# Patient Record
Sex: FEMALE | Race: BLACK OR AFRICAN AMERICAN
[De-identification: names, ages, dates, MRNs, and addresses within clinical notes are randomized per-mention and may not be internally consistent; named-entity substitution may affect disease eponyms.]

---

## 2017-05-17 ENCOUNTER — HOSPITAL ENCOUNTER (EMERGENCY)
Dept: HOSPITAL 68 - ERH | Age: 62
End: 2017-05-17
Payer: COMMERCIAL

## 2017-05-17 VITALS — WEIGHT: 114 LBS | BODY MASS INDEX: 19.46 KG/M2 | HEIGHT: 64 IN

## 2017-05-17 VITALS — SYSTOLIC BLOOD PRESSURE: 155 MMHG | DIASTOLIC BLOOD PRESSURE: 86 MMHG

## 2017-05-17 DIAGNOSIS — I10: ICD-10-CM
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
## 2017-05-17 DIAGNOSIS — Z04.3: Primary | ICD-10-CM
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
## 2017-05-17 LAB
ABSOLUTE GRANULOCYTE CT: 7.5 /CUMM (ref 1.4–6.5)
BASOPHILS # BLD: 0 /CUMM (ref 0–0.2)
BASOPHILS NFR BLD: 0.3 % (ref 0–2)
EOSINOPHIL # BLD: 0 /CUMM (ref 0–0.7)
EOSINOPHIL NFR BLD: 0.1 % (ref 0–5)
ERYTHROCYTE [DISTWIDTH] IN BLOOD BY AUTOMATED COUNT: 14 % (ref 11.5–14.5)
GRANULOCYTES NFR BLD: 85.5 % (ref 42.2–75.2)
HCT VFR BLD CALC: 35.2 % (ref 37–47)
LYMPHOCYTES # BLD: 0.7 /CUMM (ref 1.2–3.4)
MCH RBC QN AUTO: 30.5 PG (ref 27–31)
MCHC RBC AUTO-ENTMCNC: 33.4 G/DL (ref 33–37)
MCV RBC AUTO: 91.4 FL (ref 81–99)
MONOCYTES # BLD: 0.5 /CUMM (ref 0.1–0.6)
PLATELET # BLD: 243 /CUMM (ref 130–400)
PMV BLD AUTO: 8.3 FL (ref 7.4–10.4)
RED BLOOD CELL CT: 3.84 /CUMM (ref 4.2–5.4)
WBC # BLD AUTO: 8.8 /CUMM (ref 4.8–10.8)

## 2017-05-17 NOTE — ED MVC/FALL/TRAUMA COMPLAINT
History of Present Illness
 
General
Chief Complaint: Fall
Stated Complaint: BIBA FALL
Source: patient, family, old records, EMS
Exam Limitations: no limitations
 
Vital Signs & Intake/Output
Vital Signs & Intake/Output
 Vital Signs
 
 
Date Time Temp Pulse Resp B/P B/P Pulse O2 O2 Flow FiO2
 
     Mean Ox Delivery Rate 
 
 1219  81 18 155/86  98 Room Air  
 
 1015 98.2 102 18 159/93  97 Room Air  
 
 
Allergies
Coded Allergies:
NO KNOWN ALLERGIES (16)
 
Triage Nurses Notes Reviewed? yes
Onset: Abrupt
Duration: hour(s): (1), constant
Timing: recent history
Severity: mild
Severity Numbers: 1
Injuries/Fall Location: no injury
Method of Injury: fall
Loss of Consciousness: no loss of consciousness
No Modifying Factors: none
Associated Symptoms: denies
HPI:
61-year-old female presents to the ER for evaluation brought in by ambulance 
with her daughters after she was found on the ground in her kitchen.  The 
patient states that just happens.  She denies any prodromal dizziness 
lightheadedness prior to the fall on arrival the patient denies any complaints 
no chest pain abdominal pain or murmur leg injury however she has bruising noted
to the left hip.  She denies pain to her leg.  Patient has a history of frequent
falls and is noted that she was not using her cane while ambulating today.  She 
is otherwise without any complaints.
 
(BENSON ALFONSO)
Reconcile Medications
Atorvastatin Calcium (Lipitor) 40 MG TABLET   1 TAB PO DAILY cholestrol
Dipyridamole W/ Aspirin (Aggrenox 25 MG-200 MG Capsule) 25 MG-200 MG CPMP.12HR  
1 CAP PO BID HEART HEALTH  (Reported)
Lisinopril 40 MG TABLET   1 TAB PO DAILY HTN  (Reported)
Sertraline HCl 50 MG TABLET   1 TAB PO DAILY MENTAL HEALTH  (Reported)
Sofosbuvir/Velpatasvir (Epclusa 400 MG-100 MG Tablet) 400 MG-100 MG TABLET   1 
TAB PO QPM HCV  (Reported)
 
(ARLENE GABRIEL DO)
 
Past History
 
Travel History
Traveled to [place] past 21 day No
 
Medical History
Any Pertinent Medical History? see below for history
Neurological: CVA ,  major ones multiple CVAs in between  and 
EENT: NONE
Cardiovascular: hypertension, hyperlipidemia
Respiratory: NONE
Gastrointestinal: NONE
Hepatic: hepatitis C
Renal: NONE
Musculoskeletal: NONE
Psychiatric: NONE
Endocrine: NONE
Blood Disorders: NONE
Cancer(s): NONE
GYN/Reproductive: NONE
History of MRSA: No
History of VRE: No
History of CDIFF: No
Pneumonia Vaccine: 16
Influenza Vaccine: 16
 
Surgical History
Surgical History: none
 
Psychosocial History
Who do you live with Family
What is your primary language English
 
Family History
Hx Contributory? No
(BENSON ALFONSO)
 
Review of Systems
 
Review of Systems
Constitutional:
Reports: see HPI. 
All Other Systems: Reviewed and Negative
Comments
Review of systems: See HPI, All other systems negative.
Constitutional, no chills no fever, no malaise no weight loss
HEENT: No visual changes no sore throat no congestion, no ear pain
Cardiovascular: No chest pain , no palpitation , 
Skin:  no rashes, no change in skin
Respiratory: No dyspnea no cough no  sputum 
GI: No nausea no vomiting, no diarrhea, no bloating/constipation
: No dysuria No hematuria, no frequency, no discharge
Muscle skeletal: No joint pain, no joint swelling, no back pain, no neck pain,
Neurologic:  no headache
Psych: No stress 
Heme/endocrine: No bruising no bleeding
Immunology: No lymphadenopathy
(BENSON ALFONSO)
 
Physical Exam
 
Physical Exam
General Appearance: well developed/nourished, alert, awake
Comments:
Well-developed well-nourished person in no acute distress
HEENT: Normal EENT exam; PERRL, EOMI, no nystagmus. HEAD is atraumatic. moist 
mucous membranes.  
Neck: Supple, normal range of motion without pain or tenderness
Back: Nontender, no CVA tenderness. Full range of motion
Cardiovascular: Regular rate and rhythms no murmurs rubs or gallops, normal JVP
Respiratory: Chest nontender.There were no bony deformities, no asymmetry. No 
respiratory distress.  Patient speaking in full complete sentences. Breath 
sounds clear to auscultation bilaterally: NO W/R/R
Abdomen: Soft, nontender nondistended, no appreciable organomegaly. Normal bowel
sounds. No rebound/guarding, No appreciable enlargement of the abdominal aorta, 
No ascites.
UPPER Extremity: No edema, full range of motion of extremities, normal and equal
pulses bilaterally, 5 out of 5 extremities
Hip: mild ecchymosis noted to the lateral L hip, nontender, Stable. FROM. No 
pain with pelvic compression 
Knee: Atraumatic/stable. FROM. No joint swelling, no effusion. No laxity. 
Negative lachman/anterior drawer test. No pain with ROM 
Leg: Atraumatic. Nontender. No edema,  5 out of 5 strength in the lower 
extremity, normal dorsiflexion of great toe bilaterally, gross sensation is 
intact
Ankle/Foot:  Atraumatic/stable. Skin intact. FROM. No swelling, no effusion. No 
laxity on exam 
Pulses: Normal/equal DP/PT pulses bilaterally. Brisk cap refill
Neuro: Alert oriented x3, motor sensory normal, cranial nerves II through XII 
grossly intact. There were no obvious focal neurologic abnormalities.
Skin: No appreciable rash on exposed skin, skin is warm and dry.
Psych: Mood and affect is normal, memory and judgment is normal.
 
Core Measures
ACS in differential dx? Yes
Severe Sepsis Present: No
Septic Shock Present: No
(CHRISSY LINCOLN,BENSON)
 
Progress
Differential Diagnosis: C/T/L spine injury, ext injury, ICH, pelvis injury, 
spinal cord injury, uti, electrolyte abnormality
Plan of Care:
 Orders
 
 
Procedure Date/time Status
 
Straight Cath  1241 Active
 
CULTURE,URINE  1241 Active
 
URINALYSIS  1021 Complete
 
TROPONIN LEVEL  1021 Complete
 
COMPREHENSIVE METABOLIC PANEL  1021 Complete
 
CREATINE PHOSPHOKINASE  1021 Complete
 
CBC WITHOUT DIFFERENTIAL  1021 Complete
 
EKG  1021 Active
 
 
 Laboratory Tests
 
 
 
17 1251:
Urine Color YEL, Urine Clarity CLEAR, Urine pH 6.5, Ur Specific Gravity 1.015, 
Urine Protein 30  H, Urine Ketones NEG, Urine Nitrite NEG, Urine Bilirubin NEG, 
Urine Urobilinogen 0.2, Ur Leukocyte Esterase NEG, Ur Microscopic SEDIMENT 
EXAMINED, Urine RBC 1-3, Urine WBC RARE, Ur Epithelial Cells RARE, Urine 
Bacteria RARE  H, Urine Mucus RARE, Urine Hemoglobin SMALL  H, Urine Glucose NEG
 
17 1034:
Anion Gap 13, Estimated GFR > 60, BUN/Creatinine Ratio 18.8, Glucose 118  H, 
Calcium 9.4, Total Bilirubin 0.5, AST 38  H, ALT 33, Alkaline Phosphatase 59, 
Creatine Kinase 874  H, Troponin I 0.04, Total Protein 7.4, Albumin 4.1, 
Globulin 3.3, Albumin/Globulin Ratio 1.2, CBC w Diff NO MAN DIFF REQ, RBC 3.84  
L, MCV 91.4, MCH 30.5, RDW 14.0, MPV 8.3, Gran % 85.5  H, Lymphocytes % 8.5  L, 
Monocytes % 5.6, Eosinophils % 0.1, Basophils % 0.3, Absolute Granulocytes 7.5  
H, Absolute Lymphocytes 0.7  L, Absolute Monocytes 0.5, Absolute Eosinophils 0, 
Absolute Basophils 0, PUBS MCHC 33.4
 Microbiology
 1251  URINE ROUT: Urine Culture - RECD
 
Patient denies any complaints at this time resting comfortably CAT scans ordered
case discussed with Dr. gabriel who agrees with plan
 
 
Discussed with the patient and her family her CAT scan results, she is 
ambulatory with a walker here with steady gait feels well, the patient was seen 
and evaluated by dr gabriel who agrees with plan
 
I discussed with the patient at length all of their results.  I had an extensive
conversation regarding need for close follow up with their primary care 
physician this week as well as return precautions.  I answered all of their 
questions, they feel comfortable with the plan and follow-up care. 
 
 
I discussed the medications that they will receive with the patient. I gave them
signs and symptoms that could indicate an adverse reaction. I have advised them 
to limit their activities until they can see how they respond to the medication.
 
 
(CHRISSY LINCOLN,BENSON)
Diagnostic Imaging:
Viewed by Me: CT Scan.  Discussed w/RAD: CT Scan. 
Radiology Impression: PATIENT: OCTAVIO VILLANUVEA  MEDICAL RECORD NO: [id number] PRESENT 
AGE: 61  PATIENT ACCOUNT NO: [id number] : 53  LOCATION: [facility] ORDERING 
PHYSICIAN: BENSON LINCOLN     SERVICE DATE:  EXAM TYPE: CAT - CT 
ABD & PELVIS W/O IV CONTRAS; CT CHEST WO IV CONTRAST EXAMINATION: CT CHEST, 
ABDOMEN AND PELVIS WITHOUT IV CONTRAST CLINICAL INFORMATION: Trauma COMPARISON: 
Previous chest x-ray 2016. TECHNIQUE: Axial images through the chest, 
abdomen and pelvis without IV contrast obtained. Sagittal and coronal 
reconstructions on the technologist's workstation were obtained. DLP: 307 mGy-
cm. FINDINGS: CHEST: The lungs are clear. There is no pleural effusion or 
pneumothorax. No enlarged lymph nodes are seen. The heart does not appear 
enlarged. There is a small pericardial effusion. The thoracic aorta is normal in
caliber. No chest wall mass or axillary adenopathy is seen. Evaluation of solid 
organs is limited due to lack of IV contrast. The liver, spleen, pancreas, 
adrenal glands and right kidney are unremarkable. There is a small left lower 
pole renal stone measuring 1 to 2 mm. There is stool throughout the colon. There
is evidence of diverticulosis. Small and large bowel is otherwise unremarkable. 
The appendix is normal. The bladder is normal. The uterus appears enlarged. No 
ascites or free air is seen. No adenopathy is seen. The abdominal aorta is 
normal in caliber. No hernia is seen. No fracture or dislocation is seen.  The 
bones appear osteopenic. IMPRESSION: Limited exam due to lack of IV contrast. No
evidence for acute disease in the chest, abdomen or pelvis. Small left renal 
stone. Diverticulosis. Enlarged uterus. This could be better evaluated with 
pelvic ultrasound. DICTATED BY: BILL JIMENES MD.  DATE/TIME DICTATED:17 :RAD.SHEN  DATE/TIME TRANSCRIBED:17 
CONFIDENTIAL, DO NOT COPY WITHOUT APPROPRIATE AUTHORIZATION.  <Electronically 
signed in Other Vendor System>                                                  
                                     SIGNED BY: BILL JIMENES MD 17 1210
, PATIENT: OCTAVIO VILLANUEVA  MEDICAL RECORD NO: [id number] PRESENT AGE: 61  PATIENT 
ACCOUNT NO: [id number] : 53  LOCATION: [facility] ORDERING PHYSICIAN: BENSON LINCOLN     SERVICE DATE:  EXAM TYPE: CAT - CT CERV SPINE WO IV CONTRAST
EXAMINATION: CT CERVICAL SPINE WITHOUT CONTRAST CLINICAL INFORMATION: Fall. 
Trauma. COMPARISON: None. TECHNIQUE: Axial images through the cervical spine 
without contrast. Sagittal and coronal reconstructions on the technologist 
workstation were performed. DLP: 868 mGy-cm for combined CT of the head and 
cervical spine. FINDINGS: There is curvature of the lower cervical spine to the 
left. Bone alignment is otherwise normal. No fracture or dislocation is seen. 
There is multilevel degenerative spondylosis and degenerative disc disease from 
C3-C4 to C6-C7. There is heterogeneous attenuation to the bones with multiple 
lucent areas, probably representing osteopenia. Prevertebral soft tissues are 
normal. There is right carotid calcification. IMPRESSION: No fracture or 
dislocation seen. Multilevel degenerative spondylosis and degenerative disc 
disease from C3-C4 to C6-C7. DICTATED BY: BILL JIMENES MD  DATE/TIME DICTATED
:17 :RAD.SHEN  DATE/TIME TRANSCRIBED:17 CONFIDENTIAL, DO NOT COPY WITHOUT APPROPRIATE AUTHORIZATION.  <
Electronically signed in Other Vendor System>                                   
                                                    SIGNED BY: BILL JIMENES MD 17 1210, PATIENT: OCTAVIO VILLANUEVA  MEDICAL RECORD NO: [id number] PRESENT AGE: 
61  PATIENT ACCOUNT NO: [id number] : 53  LOCATION: [facility] ORDERING PHYSICIAN:
BENSON LINCOLN     SERVICE DATE:  EXAM TYPE: CAT - CT HEAD WO IV 
CONTRAST EXAMINATION: CT HEAD WITHOUT CONTRAST CLINICAL INFORMATION: Trauma. 
Evaluate for intracranial hemorrhage. COMPARISON: CT scan of the head dated 2016. MRI scan of the head dated 2016. TECHNIQUE: Contiguous axial 
imaging was performed from the skull base to vertex without intravenous 
administration of contrast. DLP: 868.44 mGy-cm this includes the dose report for
the cervical spine CT scan. FINDINGS: There is no evidence of acute intracranial
hemorrhage or territorial infarction. No abnormal mass effect or midline shift 
is seen. Gray to white matter differentiation is well preserved. No extra-axial 
fluid collections are identified. The ventricles and sulci are mildly increased 
in size, consistent with involutional changes. As seen previously, there is 
extensive periventricular deep white matter low-attenuation seen, consistent 
with ischemic small vessel disease. Superimposed multifocal small old lacunar 
infarctions are seen, involving both basal ganglia, thalami, centra semiovale. 
The osseous structures and soft tissues are normal. The mastoid air cells and 
visualized portions of the paranasal sinuses are well aerated. IMPRESSION: 1. No
evidence of acute intracranial hemorrhage or other acute intracranial process. 
2. Extensive periventricular deep white matter ischemic small vessel disease and
multifocal bilateral old lacunar infarctions, unchanged from prior study. 
DICTATED BY: ANANYA AMAYA MD  DATE/TIME DICTATED:17 
:RAD.SHEN  DATE/TIME TRANSCRIBED:17 CONFIDENTIAL, 
DO NOT COPY WITHOUT APPROPRIATE AUTHORIZATION.  <Electronically signed in Other 
Vendor System>                                                                  
                     SIGNED BY: ANANYA AMAYA MD 17 1213
Initial ED EKG: NSR AT 90, NO ACUTE ST SEG CHANGES, NORMAL AXIS
Prior EKG: unchanged (2016)
(CHRISSY LINCOLN,BENSON)
 
Departure
 
Departure
Time of Disposition: 1333
Disposition: HOME OR SELF CARE
Condition: Stable
Clinical Impression
Primary Impression: Fall
Referrals:
TOMMY OROPEZA (PCP/Family)
 
Additional Instructions:
USE YOUR WALKER OR CANE WHENEVER YOU ARE MOVING AROUND. FOLLOW UP WITH YOUR PMD 
THIS WEEK, RETURN TO THE ER WITH ANY CONCERNS
Departure Forms:
Customer Survey
General Discharge Information
(BENSON ALFONSO)
 
PA/NP Co-Sign Statement
Statement:
ED Attending supervision documentation-
 
[x] I saw and evaluated the patient. I have also reviewed all the pertinent lab 
results and diagnostic results. I agree with the findings and the plan of care 
as documented in the PA's/NP's documentation. 
 
[] I have reviewed the ED Record and agree with the PA's/NP's documentation.
 
[] Additions or exceptions (if any) to the PAs/NP's note and plan are 
summarized below:
[]
 
(ARLENE GABRIEL DO

## 2017-05-17 NOTE — CT SCAN REPORT
EXAMINATION:
CT HEAD WITHOUT CONTRAST
 
CLINICAL INFORMATION:
Trauma. Evaluate for intracranial hemorrhage.
 
COMPARISON:
CT scan of the head dated 12/11/2016. MRI scan of the head dated 12/12/2016.
 
TECHNIQUE:
Contiguous axial imaging was performed from the skull base to vertex without
intravenous administration of contrast.
 
DLP:
868.44 mGy-cm this includes the dose report for the cervical spine CT scan.
 
FINDINGS:
There is no evidence of acute intracranial hemorrhage or territorial
infarction. No abnormal mass effect or midline shift is seen. Gray to white
matter differentiation is well preserved. No extra-axial fluid collections
are identified.
 
The ventricles and sulci are mildly increased in size, consistent with
involutional changes. As seen previously, there is extensive periventricular
deep white matter low-attenuation seen, consistent with ischemic small vessel
disease. Superimposed multifocal small old lacunar infarctions are seen,
involving both basal ganglia, thalami, centra semiovale.
 
The osseous structures and soft tissues are normal. The mastoid air cells and
visualized portions of the paranasal sinuses are well aerated.
 
IMPRESSION:
 
1. No evidence of acute intracranial hemorrhage or other acute intracranial
process.
2. Extensive periventricular deep white matter ischemic small vessel disease
and multifocal bilateral old lacunar infarctions, unchanged from prior study.

## 2017-05-17 NOTE — CT SCAN REPORT
EXAMINATION:
CT CHEST, ABDOMEN AND PELVIS WITHOUT IV CONTRAST
 
CLINICAL INFORMATION:
Trauma
 
COMPARISON:
Previous chest x-ray December 2016.
 
TECHNIQUE:
Axial images through the chest, abdomen and pelvis without IV contrast
obtained. Sagittal and coronal reconstructions on the technologist's
workstation were obtained.
 
DLP:
307 mGy-cm.
 
FINDINGS:
CHEST: The lungs are clear. There is no pleural effusion or pneumothorax.
 
No enlarged lymph nodes are seen. The heart does not appear enlarged. There
is a small pericardial effusion. The thoracic aorta is normal in caliber.
 
No chest wall mass or axillary adenopathy is seen.
 
Evaluation of solid organs is limited due to lack of IV contrast. The liver,
spleen, pancreas, adrenal glands and right kidney are unremarkable. There is
a small left lower pole renal stone measuring 1 to 2 mm. There is stool
throughout the colon. There is evidence of diverticulosis. Small and large
bowel is otherwise unremarkable. The appendix is normal. The bladder is
normal. The uterus appears enlarged. No ascites or free air is seen. No
adenopathy is seen. The abdominal aorta is normal in caliber. No hernia is
seen.
 
No fracture or dislocation is seen.  The bones appear osteopenic.
 
IMPRESSION:
Limited exam due to lack of IV contrast. No evidence for acute disease in the
chest, abdomen or pelvis. Small left renal stone.
Diverticulosis. Enlarged uterus. This could be better evaluated with pelvic
ultrasound.

## 2017-05-17 NOTE — CT SCAN REPORT
EXAMINATION:
CT CERVICAL SPINE WITHOUT CONTRAST
 
CLINICAL INFORMATION:
Fall. Trauma.
 
COMPARISON:
None.
 
TECHNIQUE:
Axial images through the cervical spine without contrast. Sagittal and
coronal reconstructions on the technologist workstation were performed.
 
DLP:
868 mGy-cm for combined CT of the head and cervical spine.
 
FINDINGS:
There is curvature of the lower cervical spine to the left. Bone alignment is
otherwise normal. No fracture or dislocation is seen. There is multilevel
degenerative spondylosis and degenerative disc disease from C3-C4 to C6-C7.
There is heterogeneous attenuation to the bones with multiple lucent areas,
probably representing osteopenia. Prevertebral soft tissues are normal. There
is right carotid calcification.
 
IMPRESSION:
No fracture or dislocation seen. Multilevel degenerative spondylosis and
degenerative disc disease from C3-C4 to C6-C7.

## 2018-04-17 ENCOUNTER — HOSPITAL ENCOUNTER (EMERGENCY)
Dept: HOSPITAL 68 - ERH | Age: 63
LOS: 1 days | End: 2018-04-18
Payer: COMMERCIAL

## 2018-04-17 DIAGNOSIS — I10: ICD-10-CM
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
## 2018-04-17 DIAGNOSIS — S09.90XA: Primary | ICD-10-CM

## 2018-04-17 DIAGNOSIS — E78.5: ICD-10-CM

## 2018-04-17 LAB
ABSOLUTE GRANULOCYTE CT: 4.5 /CUMM (ref 1.4–6.5)
BASOPHILS # BLD: 0 /CUMM (ref 0–0.2)
BASOPHILS NFR BLD: 0.3 % (ref 0–2)
EOSINOPHIL # BLD: 0 /CUMM (ref 0–0.7)
EOSINOPHIL NFR BLD: 0.7 % (ref 0–5)
ERYTHROCYTE [DISTWIDTH] IN BLOOD BY AUTOMATED COUNT: 14.4 % (ref 11.5–14.5)
GRANULOCYTES NFR BLD: 72.2 % (ref 42.2–75.2)
HCT VFR BLD CALC: 35.5 % (ref 37–47)
LYMPHOCYTES # BLD: 1.2 /CUMM (ref 1.2–3.4)
MCH RBC QN AUTO: 30.7 PG (ref 27–31)
MCHC RBC AUTO-ENTMCNC: 33.2 G/DL (ref 33–37)
MCV RBC AUTO: 92.4 FL (ref 81–99)
MONOCYTES # BLD: 0.4 /CUMM (ref 0.1–0.6)
PLATELET # BLD: 243 /CUMM (ref 130–400)
PMV BLD AUTO: 7.7 FL (ref 7.4–10.4)
RED BLOOD CELL CT: 3.84 /CUMM (ref 4.2–5.4)
WBC # BLD AUTO: 6.2 /CUMM (ref 4.8–10.8)

## 2018-04-17 NOTE — ED MVC/FALL/TRAUMA COMPLAINT
**See Addendum**
History of Present Illness
 
General
Chief Complaint: Fall
Stated Complaint: UNWITNESSED FALL IN BR
Source: patient
Exam Limitations: no limitations
 
Vital Signs & Intake/Output
Vital Signs & Intake/Output
 Vital Signs
 
 
Date Time Temp Pulse Resp B/P B/P Pulse O2 O2 Flow FiO2
 
     Mean Ox Delivery Rate 
 
 1832       Room Air  
 
 1700  79 18 188/88  99 Room Air  
 
 1428 98.6 98 18 162/98  99 Room Air  
 
 
 
Allergies
Coded Allergies:
NO KNOWN ALLERGIES (16)
 
Reconcile Medications
Atorvastatin Calcium (Lipitor) 40 MG TABLET   1 TAB PO DAILY cholestrol
Dipyridamole W/ Aspirin (Aggrenox 25 MG-200 MG Capsule) 25 MG-200 MG CPMP.12HR  
1 CAP PO BID HEART HEALTH  (Reported)
Lisinopril 40 MG TABLET   1 TAB PO DAILY HTN  (Reported)
Sertraline HCl 50 MG TABLET   1 TAB PO DAILY MENTAL HEALTH  (Reported)
Sofosbuvir/Velpatasvir (Epclusa 400 MG-100 MG Tablet) 400 MG-100 MG TABLET   1 
TAB PO QPM HCV  (Reported)
 
Triage Note:
PT BIBA FROM HOME AFTER SHE FELL IN THE BATHROOM.
PT HAD UNWITNESSED FALL WHILE IN THE BR.  PT
DENIES PAIN.  PT HAD CVA WITH LEFT SIDED WEAKNESS.
FAMILY AT BEDSIDE NO DISTRESS NOTED.  PT WAITING
FOR PROVIDER EVAL
Triage Nurses Notes Reviewed? yes
Onset: Just prior to arrival
Duration: minute(s):
Timing: remote history
Severity: mild
Injuries/Fall Location: ?HEAD STRIKE
Method of Injury: fall
Loss of Consciousness: unsure
No Modifying Factors: none
HPI:
Patient is a 62-year-old female with history of recent CVA, on Aggrenox 
presenting with family members with chief complaint of fall the bathroom prior 
to arrival.   fall was unwitnessed.  Patient reports that she was in the 
bathroom.  Family reports that she left for a few minutes and then the 
granddaughter found her on the ground in the bathroom.  Unsure exactly how she 
fell.  Family reports that she was on the floor for less than 10 minutes.  
Patient denies any pain.  No chest pain or palpitations.  Patient denying any 
headaches.  Denies any vision changes.  Patient denies abdominal pain.
(Carmel LINCOLN,Linda)
 
Past History
 
Travel History
Traveled to [place] past 21 day No
 
Medical History
Any Pertinent Medical History? see below for history
Neurological: CVA ,  major ones multiple CVAs in between  and 
EENT: NONE
Cardiovascular: hypertension, hyperlipidemia
Respiratory: NONE
Gastrointestinal: NONE
Hepatic: hepatitis C
Renal: NONE
Musculoskeletal: NONE
Psychiatric: NONE
Endocrine: NONE
Blood Disorders: NONE
Cancer(s): NONE
GYN/Reproductive: NONE
History of MRSA: No
History of VRE: No
History of CDIFF: No
 
Surgical History
Surgical History: none
 
Psychosocial History
Who do you live with Family
What is your primary language English
Tobacco Use: Never used
ETOH Use: denies use
Illicit Drug Use: denies illicit drug use
 
Family History
Hx Contributory? No
(Linda Jeter)
 
Review of Systems
 
Review of Systems
Constitutional:
Reports: no symptoms. 
Comments
Review of systems: See HPI, All other systems negative.
Constitutional, no chills fever or weight loss
HEENT: No visual changes no sore throat no congestion
Cardiovascular: No chest pain ,palpitation , orthopnea or ankle swelling
Skin, no jaundice no rashes
Respiratory: No dyspnea cough sputum or hemoptysis
GI: No nausea no vomiting
: No dysuria No hematuria
Muscle skeletal: no back pain, no neck pain,
Neurologic: No numbness no INCREASED confusion
Psych: No stress anxiety or depression,.
Heme/endocrine: No bruising no bleeding no polyuria or polydipsia
Immunology: No splenectomy or history of AIDS
(Linda Jeter)
 
Physical Exam
 
Physical Exam
General Appearance: well developed/nourished, no apparent distress, alert, awake
, comfortable
Comments:
THIN person in no acute distress
HEENT: extraocular motion intact, no nystagmus. Pupils equally round and 
reactive to light and accommodation. Nose is atraumatic. External auditory canal
and Tympanic membranes clear. Pharynx normal. No swelling or edema.  No palpable
step-off deformities or hematomas noted over the entire scalp.
Neck: Supple, no lymphadenopathy, normal range of motion without pain or 
tenderness, no C-spine tenderness.
Back: Nontender, no palpable tenderness over the cervical, thoracic or lumbar 
spine.  No crepitus.  No step-off deformities.
Cardiovascular: Regular rate and rhythms no murmurs rubs or gallops, normal JVP
Respiratory: Chest nontender. No respiratory distress.breath sounds clear to 
auscultation bilaterally
Abdomen: Soft, nontender nondistended, no appreciable organomegaly. Normal bowel
sounds. No ascites, no rebound or guarding.
Extremity: No edema, no calf tenderness to palpation, normal and equal pulses.  
Muscular strength is 5 out of 5 in upper and lower extremities.   strength 
is equal and symmetric bilaterally.
Neuro: Alert oriented to person and place, motor sensory normal, cranial nerves 
II through XII grossly intact, patient has residual expressive aphasia when 
trying to speak.  Otherwise cranial nerves are intact.  Patellar reflexes are 1+
bilaterally.  No facial droop.
Skin: No appreciable rash on exposed skin, skin is warm and dry.
Psych: Mood and affect is normal, smiles, cooperative ,memory and judgment is 
normal.
 
Core Measures
ACS in differential dx? Yes
CVA/TIA Diagnosis No
Sepsis Present: No
Sepsis Focused Exam Completed? No
(Carmel LINCOLN,Linda)
 
Progress
Differential Diagnosis: INTRACRANIAL HEMORRHAGE, MINOR HEAD INJURY, 
MULTIFACTORIAL GAIT DISORDER, ELECTROLYTE ABNORMALITY, cva, tia
Plan of Care:
 Orders
 
 
Procedure Date/time Status
 
Heart Healthy Diet  L Active
 
Add-on Test (ER Only)  1459 Active
 
MISTAKE  1459 Active
 
TROPONIN LEVEL  1456 Complete
 
COMPREHENSIVE METABOLIC PANEL  1456 Complete
 
CREATINE PHOSPHOKINASE  1456 Complete
 
CBC WITHOUT DIFFERENTIAL  1456 Complete
 
EKG  1431 Active
 
 
 Laboratory Tests
 
 
 
18 1529:
Anion Gap 11, Estimated GFR > 60, BUN/Creatinine Ratio 18.6, Glucose 89, Calcium
9.4, Total Bilirubin 0.6, AST 22, ALT 31, Alkaline Phosphatase 68, Creatine 
Kinase 99, Troponin I < 0.01, Total Protein 7.8, Albumin 4.1, Globulin 3.7, 
Albumin/Globulin Ratio 1.1, CBC w Diff NO MAN DIFF REQ, RBC 3.84  L, MCV 92.4, 
MCH 30.7, MCHC 33.2, RDW 14.4, MPV 7.7, Gran % 72.2, Lymphocytes % 19.9  L, 
Monocytes % 6.9, Eosinophils % 0.7, Basophils % 0.3, Absolute Granulocytes 4.5, 
Absolute Lymphocytes 1.2, Absolute Monocytes 0.4, Absolute Eosinophils 0, 
Absolute Basophils 0
 
 
2018 4:41:39 PM patient ambulates with STEADY GAIT WITH ROLLING WALKER.  We
will trial her with a rolling walker.
 
2018 6:38:40 PM  patient unable to ambulate with rolling walker secondary 
to weakness.  Patient will be a hold OVER, PT evaluation in the morning.  Likely
rehabilitation placement pending recommendations made by physical therapy.  
Patient will be signed out to Dr. Lane pending holdover.
Diagnostic Imaging:
Viewed by Me: Radiology Read, CT Scan.  Discussed w/RAD: Radiology Read, CT 
Scan. 
Radiology Impression: PATIENT: OCTAVIO VILLANUEVA  MEDICAL RECORD NO: [id number] PRESENT 
AGE: 62  PATIENT ACCOUNT NO: [id number] : 53  LOCATION: [facility] ORDERING 
PHYSICIAN: Linda LINCOLN     SERVICE DATE:  EXAM TYPE: CAT - 
CT CERV SPINE WO IV CONTRAST; CT HEAD WO IV CONTRAST EXAMINATION: CT HEAD 
WITHOUT CONTRAST CT CERVICAL SPINE WITHOUT CONTRAST CLINICAL INFORMATION: Head 
strike. Rule out fracture. COMPARISON: Head CT from 2017. TECHNIQUE: 
Contiguous axial imaging was performed from the skullbase to vertex without 
intravenous administration of contrast. Multidetector helical imaging was 
performed through the cervical spine. DLP: 907.4 mGy-cm. FINDINGS: HEAD: There 
is no evidence of acute intracranial hemorrhage or territorial infarction. No 
abnormal mass effect or midline shift is seen. Gray to white matter 
differentiation is well preserved. No extra-axial fluid collections are 
identified. Diffuse parenchymal volume loss is noted. Severe chronic white 
matter microangiopathic changes are again noted with chronic infarcts in the 
basal ganglia and thalami bilaterally. The osseous structures and soft tissues 
are normal. The mastoid air cells are well aerated. There is mild mucosal 
thickening in the maxillary sinuses. CERVICAL SPINE: No acute fracture or 
dislocation is identified in the cervical spine. Significant multilevel disc 
space narrowing is evident with endplate osteophyte formation. The atlantoaxial 
articulation is normally maintained. The thyroid gland is heterogeneous but not 
enlarged. The paraspinal soft tissues are normal. The lung apices are clear. 
IMPRESSION: 1. No acute intracranial pathology. Severe chronic white matter 
microangiopathy and chronic deep gray matter lacunar infarcts. 2. No evidence of
acute cervical spine traumatic injury. Severe cervical spondylosis. DICTATED BY:
Quinn Solares MD  DATE/TIME DICTATED:18 :RAD.SHEN
 DATE/TIME TRANSCRIBED:18 CONFIDENTIAL, DO NOT COPY WITHOUT 
APPROPRIATE AUTHORIZATION.  <Electronically signed in Other Vendor System>      
                                                                                
SIGNED BY: Quinn Solares , PATIENT: OCTAVIO VILLANUEVA  MEDICAL 
RECORD NO: [id number] PRESENT AGE: 62  PATIENT ACCOUNT NO: [id number] : 53  
LOCATION: [facility] ORDERING PHYSICIAN: Linda LINCOLN     SERVICE DATE: 18102 EXAM TYPE: RAD - XRY-PORTABLE CHEST XRAY EXAMINATION: XR PORTABLE CHEST 
CLINICAL INFORMATION: Trauma. Presumptive diagnosis: Cardiomegaly. COMPARISON: 
CT the chest on 2017. Portable chest x-ray on 2016. TECHNIQUE: 
Portable AP semierect view of the chest was obtained. FINDINGS: No significant 
abnormality is noted involving the lungs, mediastinum, bony thorax or soft 
tissues. The heart is borderline enlarged. IMPRESSION: Heart borderline 
enlarged. No acute disease. DICTATED BY: Sushil Chaidez MD  DATE/TIME DICTATED
:18 :RAD.SHEN  DATE/TIME TRANSCRIBED:18 CONFIDENTIAL, DO NOT COPY WITHOUT APPROPRIATE AUTHORIZATION.  <
Electronically signed in Other Vendor System>                                   
                                                    SIGNED BY: Sushil Chaidez MD 18 1543, PATIENT: OCTAVIO VILLANUEVA  MEDICAL RECORD NO: [id number] PRESENT
AGE: 62  PATIENT ACCOUNT NO: [id number] : 53  LOCATION: [facility] ORDERING 
PHYSICIAN: Linda LINCOLN     SERVICE DATE:  EXAM TYPE: RAD - 
XRY-AP PELVIS EXAMINATION: XR PELVIS CLINICAL INFORMATION: Difficulty 
ambulating. COMPARISON: None TECHNIQUE: AP view of the pelvis. FINDINGS: No 
evidence of acute fracture or dislocation on this single view. There are 
degenerative changes of the distal lumbar spine and SI joints, largely obscured 
by overlying air and stool in the colon. Mild degenerative changes of the pubic 
symphysis and hips. The bowel gas pattern is nonobstructive. There are faint 
vascular calcifications and probable phleboliths in the pelvis. IMPRESSION: No 
evidence of fracture or dislocation on this single view of the pelvis. DICTATED 
BY: Silva Erwin MD  DATE/TIME DICTATED:18 :
RAD.SHEN  DATE/TIME TRANSCRIBED:18 CONFIDENTIAL, DO NOT COPY 
WITHOUT APPROPRIATE AUTHORIZATION.  <Electronically signed in Other Vendor 
System>                                                                         
              SIGNED BY: Silva Erwin MD 18 8918
Initial ED EKG: SINUS RHYTHM AT 87 BPM,NO ACUTE CHANGES FROM PREVIOUS
Hand-Off
   Endorsed To:
Samuel Lane DO
   Endorsed Time: 
   Pending: consult
(Linda Jeter)
 
Departure
 
Departure
Disposition: OTHER [facility] (ACUTE)
Condition: Stable
Clinical Impression
Primary Impression: Fall
Qualifiers:  Encounter type: initial encounter Qualified Code: W19.XXXA - 
Unspecified fall, initial encounter
Secondary Impressions: 
Minor head injury
Qualifiers:  Encounter type: initial encounter Qualified Code: S09.90XA - 
Unspecified injury of head, initial encounter
 
Referrals:
Natalie Godwin (PCP/Family)
 
Additional Instructions:
Follow-up with the primary care physician also make an appointment next 5-7 
days.  Take over-the-counter Tylenol as directed for any aches or pains.  Return
for worsening symptoms or concerns.
Departure Forms:
Customer Survey
General Discharge Information
(Linda Jeter)
 
PA/NP Co-Sign Statement
Statement:
ED Attending supervision documentation-
 
[X] I saw and evaluated the patient. I have also reviewed all the pertinent lab 
results and diagnostic results. I agree with the findings and the plan of care 
as documented in the PA's/NP's documentation. 
 
[] I have reviewed the ED Record and agree with the PA's/NP's documentation.
 
[] Additions or exceptions (if any) to the PAs/NP's note and plan are 
summarized below:
[]
 
 
 
Presently confused.  No acute distress.  There were unable to ambulate her.
(Samuel Lane DO)

## 2018-04-17 NOTE — CT SCAN REPORT
EXAMINATION:
CT HEAD WITHOUT CONTRAST
CT CERVICAL SPINE WITHOUT CONTRAST
 
CLINICAL INFORMATION:
Head strike. Rule out fracture.
 
COMPARISON:
Head CT from 05/17/2017.
 
TECHNIQUE:
Contiguous axial imaging was performed from the skullbase to vertex without
intravenous administration of contrast. Multidetector helical imaging was
performed through the cervical spine.
 
DLP:
907.4 mGy-cm.
 
FINDINGS:
 
HEAD:
There is no evidence of acute intracranial hemorrhage or territorial
infarction. No abnormal mass effect or midline shift is seen. Gray to white
matter differentiation is well preserved. No extra-axial fluid collections
are identified. Diffuse parenchymal volume loss is noted.
 
Severe chronic white matter microangiopathic changes are again noted with
chronic infarcts in the basal ganglia and thalami bilaterally. The osseous
structures and soft tissues are normal. The mastoid air cells are well
aerated. There is mild mucosal thickening in the maxillary sinuses.
 
CERVICAL SPINE:
No acute fracture or dislocation is identified in the cervical spine.
Significant multilevel disc space narrowing is evident with endplate
osteophyte formation. The atlantoaxial articulation is normally maintained.
The thyroid gland is heterogeneous but not enlarged. The paraspinal soft
tissues are normal. The lung apices are clear.
 
IMPRESSION:
1. No acute intracranial pathology. Severe chronic white matter
microangiopathy and chronic deep gray matter lacunar infarcts.
 
2. No evidence of acute cervical spine traumatic injury. Severe cervical
spondylosis.

## 2018-04-17 NOTE — RADIOLOGY REPORT
EXAMINATION:
XR PELVIS
 
CLINICAL INFORMATION:
Difficulty ambulating.
 
COMPARISON:
None
 
TECHNIQUE:
AP view of the pelvis.
 
FINDINGS:
No evidence of acute fracture or dislocation on this single view. There are
degenerative changes of the distal lumbar spine and SI joints, largely
obscured by overlying air and stool in the colon. Mild degenerative changes
of the pubic symphysis and hips. The bowel gas pattern is nonobstructive.
There are faint vascular calcifications and probable phleboliths in the
pelvis.
 
IMPRESSION:
No evidence of fracture or dislocation on this single view of the pelvis.

## 2018-04-17 NOTE — RADIOLOGY REPORT
EXAMINATION:
XR PORTABLE CHEST
 
CLINICAL INFORMATION:
Trauma. Presumptive diagnosis: Cardiomegaly.
 
COMPARISON:
CT the chest on 05/17/2017. Portable chest x-ray on 12/14/2016.
 
TECHNIQUE:
Portable AP semierect view of the chest was obtained.
 
FINDINGS:
No significant abnormality is noted involving the lungs, mediastinum, bony
thorax or soft tissues. The heart is borderline enlarged.
 
IMPRESSION:
Heart borderline enlarged. No acute disease.

## 2018-04-18 VITALS — DIASTOLIC BLOOD PRESSURE: 76 MMHG | SYSTOLIC BLOOD PRESSURE: 146 MMHG

## 2018-08-17 ENCOUNTER — HOSPITAL ENCOUNTER (INPATIENT)
Dept: HOSPITAL 68 - ERH | Age: 63
LOS: 4 days | Discharge: NURSING, REHAB OR LONG-TERM CARE | DRG: 45 | End: 2018-08-21
Attending: INTERNAL MEDICINE
Payer: COMMERCIAL

## 2018-08-17 VITALS — WEIGHT: 102 LBS | HEIGHT: 62 IN | BODY MASS INDEX: 18.77 KG/M2

## 2018-08-17 VITALS — DIASTOLIC BLOOD PRESSURE: 90 MMHG | SYSTOLIC BLOOD PRESSURE: 158 MMHG

## 2018-08-17 DIAGNOSIS — R23.3: ICD-10-CM

## 2018-08-17 DIAGNOSIS — E78.5: ICD-10-CM

## 2018-08-17 DIAGNOSIS — I10: ICD-10-CM

## 2018-08-17 DIAGNOSIS — F01.50: ICD-10-CM

## 2018-08-17 DIAGNOSIS — B18.2: ICD-10-CM

## 2018-08-17 DIAGNOSIS — I63.9: Primary | ICD-10-CM

## 2018-08-17 DIAGNOSIS — I34.0: ICD-10-CM

## 2018-08-17 DIAGNOSIS — E87.6: ICD-10-CM

## 2018-08-17 DIAGNOSIS — I69.351: ICD-10-CM

## 2018-08-17 DIAGNOSIS — I16.0: ICD-10-CM

## 2018-08-17 LAB
ABSOLUTE GRANULOCYTE CT: 4.2 /CUMM (ref 1.4–6.5)
BASOPHILS # BLD: 0 /CUMM (ref 0–0.2)
BASOPHILS NFR BLD: 0.3 % (ref 0–2)
EOSINOPHIL # BLD: 0 /CUMM (ref 0–0.7)
EOSINOPHIL NFR BLD: 0.6 % (ref 0–5)
ERYTHROCYTE [DISTWIDTH] IN BLOOD BY AUTOMATED COUNT: 15.3 % (ref 11.5–14.5)
GRANULOCYTES NFR BLD: 69.3 % (ref 42.2–75.2)
HCT VFR BLD CALC: 35.5 % (ref 37–47)
LYMPHOCYTES # BLD: 1.5 /CUMM (ref 1.2–3.4)
MCH RBC QN AUTO: 30.6 PG (ref 27–31)
MCHC RBC AUTO-ENTMCNC: 34.1 G/DL (ref 33–37)
MCV RBC AUTO: 90 FL (ref 81–99)
MONOCYTES # BLD: 0.3 /CUMM (ref 0.1–0.6)
PLATELET # BLD: 261 /CUMM (ref 130–400)
PMV BLD AUTO: 7.7 FL (ref 7.4–10.4)
RED BLOOD CELL CT: 3.94 /CUMM (ref 4.2–5.4)
WBC # BLD AUTO: 6 /CUMM (ref 4.8–10.8)

## 2018-08-17 PROCEDURE — 70551 MRI BRAIN STEM W/O DYE: CPT

## 2018-08-17 NOTE — HISTORY & PHYSICAL
Tam Layton 08/17/18 2107:
General Information and HPI
MD Statement:
I have seen and personally examined OCTAVIO VILLANUEVA and documented this H&P.
 
The patient is a 62 year old F who presented with a patient stated chief 
complaint of [RLE weakness, slumped to Left side since 10am, last normal last 
evening].
 
Source of Information: family, old records
Exam Limitations: clinical condition, dementia
History of Present Illness:
62 year old female with history of vascular dementia, s/p multiple previous CVA'
s with residual RLE weakness and slurring of speech, on Aggrenox at home, Hep C,
HLD, presents with worsening RLE weakness since 10am this morning, unable to 
ambulate per daughter. Pt was able to understand commands, but did not know time
of day or where she was. History limited by patient secondary to dementia, which
was baseline per daugther. History was obtained from old records and calling 
family, daughter CAMRYN: Last time pt was at baseline strength per daughter was 
when they ate dinner together last night 8/16/18. Pt lives with daughter and has
a visiting home health aide; this morning when nurse aid come to the home around
10 -11 AM she noticed that patient had weakness in her legs, was leaning towards
left side and her blood pressure is 240/110; they called ambulance who brought 
her to [facility] ED. Pt was in her usual state of health yesterday 8/16 and they 
did not notice any fevers/chills/odd behavior/urinary symptoms/falls. Major 
concern for family was the increased weakness in lower extremities and the blood
pressure, as patient had taken her morning blood pressure medication.
 
Allergies/Medications
Allergies:
Coded Allergies:
NO KNOWN ALLERGIES (12/11/16)
 
Home Med list
Atorvastatin Calcium 40 MG TABLET   1 TAB PO DAILY CHOLESTEROL  (Reported)
Dipyridamole W/ Aspirin (Aggrenox 25 MG-200 MG Capsule) 25 MG-200 MG CPMP.12HR  
1 CAP PO BID HEART HEALTH  (Reported)
Lactose-Reduced Food (Ensure Liquid) 237 ML LIQUID   237 ML PO BID NUTRITIONAL 
SUPPLEMENT  (Reported)
Lisinopril 30 MG TABLET   1 TAB PO DAILY BP  (Reported)
Sertraline HCl 50 MG TABLET   1 TAB PO DAILY MENTAL HEALTH  (Reported)
 
 
Past History
 
Travel History
Traveled to [place] past 21 day No
 
Medical History
Neurological: CVA 2001, 2011 major ones multiple CVAs in between 2001 and 2011
EENT: NONE
Cardiovascular: hypertension, hyperlipidemia
Respiratory: NONE
Gastrointestinal: NONE
Hepatic: hepatitis C
Renal: NONE
Musculoskeletal: NONE
Psychiatric: NONE
Endocrine: NONE
Blood Disorders: NONE
Cancer(s): NONE
GYN/Reproductive: NONE
History of MRSA: No
History of VRE: No
History of CDIFF: No
Isolation History: Standard
 
Surgical History
Surgical History: none
 
Past Family/Social History
 
Psychosocial History
Who Do You Live With? child, family
Primary Language: English
Living Will? no
 
Functional Ability
ADLs
Needs Assist: dressing, eating, toileting, bathing. 
Ambulation: cane, walker
IADLs
Needs Assist: shopping, housework, finances, food prep, telephone, 
transportation, medication admin. 
 
Review of Systems
 
Review of Systems
Constitutional:
Reports: see HPI. 
 
Exam & Diagnostic Data
Last 24 Hrs of Vital Signs/I&O
 Vital Signs
 
 
Date Time Temp Pulse Resp B/P B/P Pulse O2 O2 Flow FiO2
 
     Mean Ox Delivery Rate 
 
08/17 2053  72  190/90     
 
08/17 1958 98.6 71 20 190/90  97 Room Air  
 
08/17 1937  72       
 
08/17 1809    204/100     
 
08/17 1649      96 Room Air  
 
08/17 1645 98.9  18 220/108  97 Room Air  
 
 
 
 
Physical Exam
General Appearance Alert, Cooperative, No Acute Distress, THIN, alert, oriented 
x1; pt has dementia is difficult to understant but follows commands, leaning to 
left side
Skin No Rashes
Skin Temp/Moisture Exam: Warm/Dry
HEENT Atraumatic, EOMI, Mucous Membr. moist/pink
Neck Supple
Cardiovascular Regular Rate, Normal S1, Normal S2
Lungs Clear to Auscultation, Normal Air Movement
Abdomen Soft, No Tenderness
Neurological Sensation Intact, Cranial Nerves 3-12 NL, strength 4+/5 at distal b
/l LE; 5/5 to upper extremities
Extremities Normal Pulses
Vascular Pulses Symmetrical
Last 24 Hrs of Labs/Moses:
 Laboratory Tests
 
08/17/18 1645:
Anion Gap 9, Estimated GFR > 60, BUN/Creatinine Ratio 21.3, Glucose 118  H, 
Calcium 9.6, Total Bilirubin 0.4, AST 18, ALT 22, Alkaline Phosphatase 53, Total
Protein 7.7, Albumin 4.2, Globulin 3.5, Albumin/Globulin Ratio 1.2, CBC w Diff 
NO MAN DIFF REQ, RBC 3.94  L, MCV 90.0, MCH 30.6, MCHC 34.1, RDW 15.3  H, MPV 
7.7, Gran % 69.3, Lymphocytes % 24.5, Monocytes % 5.3, Eosinophils % 0.6, 
Basophils % 0.3, Absolute Granulocytes 4.2, Absolute Lymphocytes 1.5, Absolute 
Monocytes 0.3, Absolute Eosinophils 0, Absolute Basophils 0
 
08/17/18 1642:
Urine Color Cancelled, Urine Clarity Cancelled, Urine pH Cancelled, Ur Specific 
Gravity Cancelled, Urine Protein Cancelled, Urine Ketones Cancelled, Urine 
Nitrite Cancelled, Urine Bilirubin Cancelled, Urine Urobilinogen Cancelled, Ur 
Leukocyte Esterase Cancelled, Ur Microscopic Cancelled, Urine Hemoglobin 
Cancelled, Urine Glucose Cancelled
 
 
Diagnostic Data
EKG Results
NSr, heart rate 75, hypertensive changes
 
Assessment/Plan
Assessment:
Mrs Villanueva is a 62F with history of vascular dementia, s/p multiple previous CVA
's (last hospitalization at [facility] in 2016 for CVA) with residual RLE weakness 
and slurring of speech, on Aggrenox at home, Hep C, HLD, presents with worsening
RLE weakness since 10am this morning, unable to ambulate per daughter and 
hypertensive. As she has baseline dementia, this is hypertensive urgency until 
proven otherwise. She had focal neuro deficits (decreased RLE strength with 
inability to ambulate) however was 4+ on exam in ED, likely resolved. Her last 
normal was last night, and so is out of the window for tPA. CT head negative; 
MRI ordered but not completed.
 
Problem List:
#Hypertensive urgency 
#TIA vs Stroke
#Hypokalemia
#PMH Dementia, CVA, Hep C, HLD
 
#Hypertensive urgency vs emergency
-Admit to Telemetry
-Maintain BP below 190/100; permissive hypertension 180/100
-Labetalol 10mg IV if pt becomes crosses threshold BP
-Blood pressure checks q2 hours for 8-10 hours
-Continue ASA/dipyramadole, statin, lisinopril
 
#TIA vs Stroke
-Neurology reccs appreciated
-MRI brain in the am
-Passed swallow eval in ED
-Permissive hypertension
-Neuro checks q2
-PT/OT in AM
 
HypoK
-Recieved K in ED
-F/u BMP in AM
 
DVT PPx: ALPS and Heparin
IV access
Regular Diet
Full Code
Dispo: per PT reccs
 
As Ranked By This Provider
Problem List:
 1. TIA (transient ischemic attack)
 
 
Core Measures/Misc (9/17)
 
Acute Coronary Syndrome
ACS Diagnosis: No
 
Congestive Heart Failure
Congestive Heart Failure Diagnosis No
 
Cerebrovascular Accident
CVA/TIA Diagnosis: Yes
Date Last Known Well: 08/16/18
Time Last Known Well: 2000
Symptom Start Date: 08/17/18
Symptom Start Time: 0900
tPA Risk/Benefit discussion
I have discussed the risks, benefits, and alternatives of Alteplase treatment 
including:
- If given promptly, can resolve or have major improvement in stroke symptoms.
- Bleeding (hemorrhage) is the most common risk that can occur.
- Bleeding may occur into the brain and cause~long term serious disability~
including death - this is rare, affecting about 1% of patients.
- Alternative treatments with proven benefit for patients with stroke include 
aspirin and care in a specialized unit where staff members pay careful attention
to a variety of basic aspects of care.
 
tPA given? No
Reason tPA not ordered Medical Contraindication (out of window)
Swallow Evaluation Pass
Current/Past Hx AFib/AFlutter No
 
VTE (View Protocol)
VTE Risk Factors Age>40
No Mechanical VTE Prophylaxis d/t N/A MechProphylax Ordered
No VTE Pharm Prophylaxis d/t Medical Contraindication (on asa, severe 
hypertension)
 
Sepsis (View protocol)
Sepsis Present: No
If YES complete Sepsis Event Note If YES complete Sepsis Event Note
 
Misa SHARMA,Silvia 08/17/18 2153:
Exam & Diagnostic Data
Last 24 Hrs of Vital Signs/I&O
 Vital Signs
 
 
Date Time Temp Pulse Resp B/P B/P Pulse O2 O2 Flow FiO2
 
     Mean Ox Delivery Rate 
 
08/20 1440 97.8 90 20 150/86  98   
 
08/20 1144       Room Air  
 
08/20 0820  81  140/84     
 
08/20 0739 98.7 74 18 138/94  98 Room Air  
 
08/20 0000       Room Air  
 
08/19 2313    150/88     
 
08/19 2221 98.7 77 18 160/84  98   
 
 
 Intake & Output
 
 
 08/20 1600 08/20 0800 08/20 0000
 
Intake Total 320 120 120
 
Output Total   
 
Balance 320 120 120
 
    
 
Intake, Oral 320 120 120
 
Number   2
 
Bowel   
 
Movements   
 
Patient 47.259 kg  47.259 kg
 
Weight   
 
 
 
 
 
Core Measures/Misc (9/17)
 
Cerebrovascular Accident
NIH Stroke Scale:
Total 4
No Antithrombotic d/t Medical Contraindication
No Anticoagulant d/t Medical Contraindication
Comment cannt access due to previous d
 
Sepsis (View protocol)
If YES complete Sepsis Event Note If YES complete Sepsis Event Note
 
Resident Review Statement
Resident Statement: examined this patient, discussed with intern, agreed with 
intern
Other Findings:
Patient is a 61-year-old female with past medical history of hepatitis C(
completed treatment;sofobvir), hypertension, hyperlipidemia, multiple vascular 
infarct( 2001, 2011, 2016 s/p TPA), vascular dementia,mitral regurgitation, 
urinary incontinence, presentd with weakness of left legs.
 
Most of the history taken from the daughter over the phone.  According to her 
she was at her baseline usually walks with a walker.  She lives at home with the
daughter Ms. Laureano, a home health and usually come daily to help her out.  She 
usually walks with a walker.  She was completely all right till yesterday.  In 
the morning she was having difficulty in the lifting her left leg and lost her 
balance.  When nurse aid come to the home around 10 -11 AM she called her 
daughter and said that she is leaning more towards the left side and her blood 
pressure is 240/110, so they brought her to the [facility] ED.
 
Past medical history -
Hypertension
Hyperlipidemia
History of hepatitis C treated with the SOFOSBUVIR
History of multiple vascular  infarct leading to vascular dementia
Mitral regurgitation
 
Vital signs at the time of admission -temperature 98.9, respiratory rate 18, 
blood pressure 220/108, SPO2 97% on room air.
 
Blood workup -hemoglobin 12.1, hematocrit 35.5, platelet count 261, granulocyte 
69.3, sodium 143, potassium 3.1, chloride 105, carbon DEXA 29, anion gap 9, BUN 
17, creatinine 0.8, glucose 118, calcium 9.6, total bilirubin 0.4, AST 18, ALT 
22, alkaline phosphatase 55, albumin 4.2,
 
CT scan of the head -No evidence for acute intracranial injury. Stable 
appearance of the brain.
 
CXR -No evidence for acute disease.  
 
Assessment and plan -patient is a 62-year-old female with past medical history 
of multiple CVA leading to vascular dementia.  She is baseline walk with a 
walker and needed nursing aid, to give her daily medications.  In the morning 
she was found to have left leg weakness followed by high blood pressure that is 
why brought to the ED.  Her blood workup was normal except hypokalemia and CT 
scan and chest x-ray did not show any evidence of acute cardiopulmonary changes 
or hemorrhage/stroke.  On examination she was not having any focal neurological 
deficit.  Discussed with the doctor Jimmy over the phone, he advised that, as 
she is not having any focal neurological deficit it seems that she may be having
TIA or hypertensive emergency.  She is already on aspirin/dipyridamole, and high
-dose atorvastatin.  We need to control her blood pressure in the range of 180/
100.  He wanted us to continue tablet lisinopril 30 mg daily.  He thinks MRI 
will not help though if needed can be done in the morning.
 
Plan  -
Focal neurological deficit possibly secondary to hypertensive emergency, TIA, 
stroke -
* Admit the patient to telemetry floor.
* Neuro check every 2 hourly
* Permissive hypertension -keep target blood pressure 180/100.
* We will continue aspirin/dipyridamole, atorvastatin, lisinopril
* Follow-up neurology recommendation
* MRI of the brain-tomorrow
* Strict intake output charting
* PT/OT
* Case management evaluation for rehabilitation
* Swallow evaluation
 
Hypokalemia -
* We will repeat the BMP tomorrow
* Potassium is already supplemented in ED
 
CODE STATUS -confirmed from her daughter Clarisa -full code
 
Diet -regular diet
 
DVT prophylaxis -CARMEN/aspirin/dipyridamole
 
Of note -
Please call Mrs.Estella Aguiar -[phone]
 
 
 
 
 
 
Marina SHARMA,Chloé 08/18/18 0010:
Core Measures/Misc (9/17)
 
Sepsis (View protocol)
If YES complete Sepsis Event Note If YES complete Sepsis Event Note
 
Attending MD Review Statement
 
Attending Statement
Attending Assessment/Plan:
This is a 62-year-old female presenting to the hospital for evaluation of 
worsening aphasia and right lower extremity weakness which started earlier this 
morning.  Of note, patient has a past medical history significant for multiple 
CVAs/TIA resulting in mild right lower extremity weakness.  Patient ambulates 
using a walker.  Upon arrival to the emergency department she was found to be 
profoundly hypertensive with apparent confusion worsened from her baseline.
 
Problem list:
Hypertensive emergency leading to changes in mental status associated with 
worsening right lower extremity weakness weakness which is now resolved.  
Differential diagnosis includes acute CVA versus hypertensive emergency versus 
PRESS syndrome.  Neurology was contacted and recommended better blood pressure 
control.  Patient had a CT of the head which was negative.  MRI of the brain 
will not be able to be completed until tomorrow morning.
 
Plan:
We will place blood pressure parameters; tolerate blood pressure no higher than 
190/100
Continue home antihypertensive lisinopril 30 mg p.o. daily and if needed we will
add labetalol 10 mg IV as needed
Monitor blood pressure every 2 hours over the next 8-10 hours
Place patient on telemetry
MRI of the brain tomorrow morning
Continue aspirin and statin
Monitor neuro exam; if focal deficits appreciated we will repeat CT scan of the 
head
Trend troponins
Neurology consultation appreciated

## 2018-08-17 NOTE — HISTORY & PHYSICAL
General Information and HPI
MD Statement:
I have seen and personally examined OCTAVIO VILLANUEVA and documented this H&P.
 
The patient is a 62 year old F who presented with a patient stated chief 
complaint of [cva].
 
Source of Information: family, old records
Exam Limitations: clinical condition
History of Present Illness:
Patient is a 61-year-old female with past medical history of hepatitis C(
completed treatment;sofobvir), hypertension, hyperlipidemia, multiple vascular 
infarct( 2001, 2011, 2016 s/p TPA), vascular dementia,mitral regurgitation, 
presentd with weakness of left legs.
 
Most of the history taken from the daughter over the phone.  According to her 
she was at her baseline usually walks with a walker.  She lives at home with the
daughter Ms. Laureano, a home health and usually come daily to help her out.  She 
usually walks with a walker.  She was completely all right till yesterday.  In 
the morning she was having difficulty in the lifting her left leg and lost her 
balance.  When the 8 come to the home she called her daughter and said that she 
is leaning more towards the left side and her blood pressure is 240/110, so they
brought her to the [facility] ED.
 
Past medical history -
Hypertension
Hyperlipidemia
History of hepatitis C treated with the SOFOSBUVIR
History of multiple vascular  infarct leading to vascular dementia
Mitral regurgitation
 
 
 
 
 
 
 
Allergies/Medications
Allergies:
Coded Allergies:
NO KNOWN ALLERGIES (12/11/16)
 
Home Med list
Atorvastatin Calcium 40 MG TABLET   1 TAB PO DAILY CHOLESTEROL  (Reported)
Dipyridamole W/ Aspirin (Aggrenox 25 MG-200 MG Capsule) 25 MG-200 MG CPMP.12HR  
1 CAP PO BID HEART HEALTH  (Reported)
Lactose-Reduced Food (Ensure Liquid) 237 ML LIQUID   237 ML PO BID NUTRITIONAL 
SUPPLEMENT  (Reported)
Lisinopril 30 MG TABLET   1 TAB PO DAILY BP  (Reported)
Sertraline HCl 50 MG TABLET   1 TAB PO DAILY MENTAL HEALTH  (Reported)
 
 
Past History
 
Travel History
Traveled to [place] past 21 day No
 
Medical History
Neurological: CVA 2001, 2011 major ones multiple CVAs in between 2001 and 2011
EENT: NONE
Cardiovascular: hypertension, hyperlipidemia
Respiratory: NONE
Gastrointestinal: NONE
Hepatic: hepatitis C
Renal: NONE
Musculoskeletal: NONE
Psychiatric: NONE
Endocrine: NONE
Blood Disorders: NONE
Cancer(s): NONE
GYN/Reproductive: NONE
History of MRSA: No
History of VRE: No
History of CDIFF: No
 
Surgical History
Surgical History: none
 
Past Family/Social History
 
Psychosocial History
Who Do You Live With? child, family
Primary Language: English
Living Will? no
 
Functional Ability
ADLs
Needs Assist: dressing, eating, toileting, bathing. 
Ambulation: cane, walker
IADLs
Needs Assist: shopping, housework, finances, food prep, telephone, 
transportation, medication admin. 
 
Review of Systems
 
Review of Systems
Constitutional:
Reports: no symptoms. 
Comments
Cannot comment because of the patient's clinical situation
 
Exam & Diagnostic Data
Last 24 Hrs of Vital Signs/I&O
 Vital Signs
 
 
Date Time Temp Pulse Resp B/P B/P Pulse O2 O2 Flow FiO2
 
     Mean Ox Delivery Rate 
 
08/17 2053  72  190/90     
 
08/17 1958 98.6 71 20 190/90  97 Room Air  
 
08/17 1937  72       
 
08/17 1809    204/100     
 
08/17 1649      96 Room Air  
 
08/17 1645 98.9  18 220/108  97 Room Air  
 
 
 
 
Physical Exam
General Appearance Alert, Cooperative, No Acute Distress
Cardiovascular Normal S1, Normal S2
Lungs Clear to Auscultation, Normal Air Movement
Abdomen Soft, No Tenderness
Neurological stifness in upper and lower legs, power 4/5, following all the 
commands,
Extremities No Clubbing, No Cyanosis, No Edema
Last 24 Hrs of Labs/Moses:
 Laboratory Tests
 
08/17/18 1645:
Anion Gap 9, Estimated GFR > 60, BUN/Creatinine Ratio 21.3, Glucose 118  H, 
Calcium 9.6, Total Bilirubin 0.4, AST 18, ALT 22, Alkaline Phosphatase 53, Total
Protein 7.7, Albumin 4.2, Globulin 3.5, Albumin/Globulin Ratio 1.2, CBC w Diff 
NO MAN DIFF REQ, RBC 3.94  L, MCV 90.0, MCH 30.6, MCHC 34.1, RDW 15.3  H, MPV 
7.7, Gran % 69.3, Lymphocytes % 24.5, Monocytes % 5.3, Eosinophils % 0.6, 
Basophils % 0.3, Absolute Granulocytes 4.2, Absolute Lymphocytes 1.5, Absolute 
Monocytes 0.3, Absolute Eosinophils 0, Absolute Basophils 0
 
 
Diagnostic Data
EKG Results
NSr, heart rate 75, hypertensive changes
 
 
Core Measures/Misc (9/17)
 
Cerebrovascular Accident
CVA/TIA Diagnosis: Yes
Date Last Known Well: 08/16/18
Time Last Known Well: 2000
Symptom Start Date: 08/17/18
Symptom Start Time: 0900
Swallow Evaluation Pass
 
Sepsis (View protocol)
If YES complete Sepsis Event Note If YES complete Sepsis Event Note

## 2018-08-17 NOTE — CT SCAN REPORT
EXAMINATION:
CT HEAD WITHOUT CONTRAST
 
CLINICAL INFORMATION:
Altered mental status.
 
COMPARISON:
04/17/2018.
 
TECHNIQUE:
Contiguous axial images of the brain were obtained without IV contrast.
 
DLP: 625 mGy-cm.
 
FINDINGS:
There are no pathologic extra-axial fluid collections. The lateral, third,
fourth ventricles are prominent, though age-appropriate, stable and
concordant with the appearance of the sulci. There is no evidence for acute
intraparenchymal hemorrhage or infarct. There is periventricular
low-attenuation indicative of small vessel disease. There is neither mass nor
mass effect. There is no shift of midline structures. The paranasal sinuses
and mastoid air cells are clear. There are no osseous lesions.
 
IMPRESSION:
No evidence for acute intracranial injury. Stable appearance of the brain.

## 2018-08-17 NOTE — ED AMS/SEIZURE/WEAK/DIZZY
History of Present Illness
 
General
Chief Complaint: Altered Mental Status
Stated Complaint: BIBA AMS
Source: family
Exam Limitations: unable to give history, not alert/orientated, dementia
 
Vital Signs & Intake/Output
Vital Signs & Intake/Output
 Vital Signs
 
 
Date Time Temp Pulse Resp B/P B/P Pulse O2 O2 Flow FiO2
 
     Mean Ox Delivery Rate 
 
08/17 1809    204/100     
 
08/17 1649      96 Room Air  
 
08/17 1645 98.9  18 220/108  97 Room Air  
 
 
 
Allergies
Coded Allergies:
NO KNOWN ALLERGIES (12/11/16)
 
Reconcile Medications
Atorvastatin Calcium (Lipitor) 40 MG TABLET   1 TAB PO DAILY cholestrol
Dipyridamole W/ Aspirin (Aggrenox 25 MG-200 MG Capsule) 25 MG-200 MG CPMP.12HR  
1 CAP PO BID HEART HEALTH  (Reported)
Lisinopril 40 MG TABLET   1 TAB PO DAILY HTN  (Reported)
Sertraline HCl 50 MG TABLET   1 TAB PO DAILY MENTAL HEALTH  (Reported)
 
Triage Nurses Notes Reviewed? yes
HPI:
62 year old female with history of vascular dementia, s/p multiple previous CVA'
s with residual RLE weakness and slurring of speech, on Aggrenox at home, 
received tPA in February of this year s/p stroke. She presents with further 
weakness of her lower extremities, especially the RLE and found at home to be 
leaning to her left side, with diminished speech. She was last known well last 
evening, and her daughter found her at home this morning around 10am with the 
aforementioned symptoms. The patient's daughter reported that she took her 
Lisinopril this morning, and in the setting of possible stroke/TIA it was 
elected to allow permissive hypertension, with continued observation of the 
blood pressure and consider treatment if the BP should remain elevated. The 
patient also passed a bedside swallow evaluation upon initial assessment. 
(Kris SHARMA,José Miguel)
 
Past History
 
Travel History
Traveled to [place] past 21 day No
 
Medical History
Any Pertinent Medical History? see below for history
Neurological: CVA 2001, 2011 major ones multiple CVAs in between 2001 and 2011
EENT: NONE
Cardiovascular: hypertension, hyperlipidemia
Respiratory: NONE
Gastrointestinal: NONE
Hepatic: hepatitis C
Renal: NONE
Musculoskeletal: NONE
Psychiatric: NONE
Endocrine: NONE
Blood Disorders: NONE
Cancer(s): NONE
GYN/Reproductive: NONE
History of MRSA: No
History of VRE: No
History of CDIFF: No
 
Surgical History
Surgical History: none
 
Psychosocial History
Who do you live with Family
What is your primary language English
 
Family History
Hx Contributory? No
(José Miguel Ponce MD)
 
Review of Systems
 
Review of Systems
Constitutional:
Reports: see HPI. 
Neurological/Psychological:
Reports: dementia, headache. 
(José Miguel Ponce MD)
 
Review of Systems
Constitutional:
Reports: see HPI. 
EENTM:
Reports: no symptoms. 
Respiratory:
Reports: no symptoms. 
Cardiovascular:
Reports: no symptoms. 
GI:
Reports: no symptoms. 
Genitourinary:
Reports: no symptoms. 
Musculoskeletal:
Reports: no symptoms. 
Skin:
Reports: no symptoms. 
Neurological/Psychological:
Reports: see HPI. 
Hematologic/Endocrine:
Reports: no symptoms. 
Immunologic/Allergic:
Reports: no symptoms. 
All Other Systems: Reviewed and Negative
(Nigel SHARMA,Quinn BARRAZA)
 
Physical Exam
 
Physical Exam
General Appearance: well developed/nourished, no apparent distress, awake
Cardiovascular: regular rate/rhythm, systolic murmur
Neurologic/Psych: awake, alert, motor weakness (RLE ), Oriented to person and 
place
 
Core Measures
ACS in differential dx? No
CVA/TIA Diagnosis Yes
NIH Stroke Scale (24 Hours)
 
 
NIH Stroke Scale (24 Hours) Response Value
 
Level of Consciousness alert 0
 
Best Gaze normal 0
 
Visual Fields no visual loss 0
 
Facial Paresis normal 0
 
Total   0
 
 
Date Last Known Well 08/16/18
Time Last Known Well 2000
Symptom start date 08/17/18
Symptom start time 0900
tPA Risk/Benefit discussion
I have discussed the risks, benefits, and alternatives of Alteplase treatment 
including:
- If given promptly, can resolve or have major improvement in stroke symptoms.
- Bleeding (hemorrhage) is the most common risk that can occur.
- Bleeding may occur into the brain and cause~long term serious disability~
including death - this is rare, affecting about 1% of patients.
- Alternative treatments with proven benefit for patients with stroke include 
aspirin and care in a specialized unit where staff members pay careful attention
to a variety of basic aspects of care.
 
tPA given? No
Reason tPA not given Medical Contraindication (Outside of window period)
Swallow Evaluation Pass
Swallow eval date 08/17/18
Swallow eval time 1800
Sepsis Present: No
Sepsis Focused Exam Completed? No
(José Miguel Ponce MD)
 
Physical Exam
Head: atraumatic
Eyes:
Bilateral: PERRL. 
Ears, Nose, Throat: normal pharynx
Neck: normal inspection, supple
Respiratory: normal breath sounds, chest non-tender, no respiratory distress, 
lungs clear
Gastrointestinal: normal bowel sounds, soft, non-tender
Extremities: normal range of motion
(Nigel SHARMA,Quinn BARRAZA)
 
Progress
Differential Diagnosis: CVA/stroke, dehydration, intracranial Hem., UTI/pyelo
Plan of Care:
 Orders
 
 
Procedure Date/time Status
 
Regular Diet 08/18 B Active
 
BASIC ELECTROLYTES PLUS BUN&CR 08/18 0000 Active
 
Patient Data 08/17 1901 Active
 
SWALLOW EVALUATION 08/17 1851 Active
 
Misc Message 08/17 1851 Active
 
ED Holding Orders 08/17 1851 Active
 
Admit to inpatient 08/17 1851 Active
 
Vital Signs 08/17 1851 Active
 
MRI-HEAD W/O HEMANTH 08/17 1851 Active
 
Code Status 08/17 1851 Active
 
EKG 08/17 1724 Active
 
Intake & Output 08/17 1646 Active
 
URINALYSIS 08/17 1642 Active
 
COMPREHENSIVE METABOLIC PANEL 08/17 1642 Complete
 
CBC WITHOUT DIFFERENTIAL 08/17 1642 Complete
 
 
 Laboratory Tests
 
 
 
08/17/18 1645:
Anion Gap 9, Estimated GFR > 60, BUN/Creatinine Ratio 21.3, Glucose 118  H, 
Calcium 9.6, Total Bilirubin 0.4, AST 18, ALT 22, Alkaline Phosphatase 53, Total
Protein 7.7, Albumin 4.2, Globulin 3.5, Albumin/Globulin Ratio 1.2, CBC w Diff 
NO MAN DIFF REQ, RBC 3.94  L, MCV 90.0, MCH 30.6, MCHC 34.1, RDW 15.3  H, MPV 
7.7, Gran % 69.3, Lymphocytes % 24.5, Monocytes % 5.3, Eosinophils % 0.6, 
Basophils % 0.3, Absolute Granulocytes 4.2, Absolute Lymphocytes 1.5, Absolute 
Monocytes 0.3, Absolute Eosinophils 0, Absolute Basophils 0
 
Diagnostic Imaging:
Viewed by Me: Radiology Read.  Discussed w/RAD: Radiology Read. 
Radiology Impression: no acute abnormality
Initial ED EKG: normal axis, normal intervals, LVH
(Kris SHARMA,José Miguel)
 
Departure
 
Departure
Disposition: STILL A PATIENT
Condition: Stable
Clinical Impression
Primary Impression: TIA (transient ischemic attack)
Referrals:
Natalie Godwin (PCP/Family)
 
Departure Forms:
Customer Survey
General Discharge Information
 
Admission Note
Documentation of Exam:
Documentation of any treatments & extenuating circumstances including Concerns 
Regarding Discharge (functional status, medication knowledge or non-compliance, 
living conditions, etc.) that warrant an admission rather than observation: [The
patient presented with new neurological symptoms in the setting of multiple 
prior strokes, concerning for new stroke or TIA. With her symptoms she would 
likely have been at risk of further clinical deterioration at home, possible 
additional strokes, and as she lives alone at home she would have been unsafe to
be at home with possible evolving stroke, without inpatient medical monitoring 
and treatment. She should be seen by a neurologist and have further evaluation 
as an inpatient for possible stroke to include an MRI of the brain, as well as 
reassessment of her current medications and risk factors in the setting of 
multiple strokes. Because of all of these factors I expect this patient to 
require a multiple day hospital admission as an inpatient on the telemetry 
service. ]
 
(Kris SHARMA,José Miguel)
 
Admission Note
Spoke With:
Marina SHARMA,Chloé
 
Resident Co-Sign Statement
Statement:
ED Attending supervision documentation-
 
[X] I saw and evaluated the patient. I have also reviewed all the pertinent lab 
results and diagnostic results. I agree with the findings and the plan of care 
as documented in the Resident's documentation. 
 
[X] I have reviewed the ED Record and agree with the Resident's documentation.
 
[] Additions or exceptions (if any) to the Resident's note and plan are 
summarized below:
[]
 
(Nigel SHARMA,Quinn BARRAZA)
 
ED Attending Observation
Initial Observation Note:
I have seen and personally examined OCTAVIO VILLANUEVA 
on 08/17/18 at 1752. 
 
I agree with the current emergency department documentation.  The disposition 
(admission or discharge) is uncertain at this time, she needs a period of 
observation for the following reason(s): 
 
The ED Nurse caring for this patient has been personally informed as to what the
patient is being observed for.
 
(Kris SHARMA,José Miguel)

## 2018-08-18 VITALS — DIASTOLIC BLOOD PRESSURE: 80 MMHG | SYSTOLIC BLOOD PRESSURE: 150 MMHG

## 2018-08-18 VITALS — SYSTOLIC BLOOD PRESSURE: 140 MMHG | DIASTOLIC BLOOD PRESSURE: 72 MMHG

## 2018-08-18 VITALS — SYSTOLIC BLOOD PRESSURE: 118 MMHG | DIASTOLIC BLOOD PRESSURE: 70 MMHG

## 2018-08-18 VITALS — SYSTOLIC BLOOD PRESSURE: 158 MMHG | DIASTOLIC BLOOD PRESSURE: 98 MMHG

## 2018-08-18 VITALS — SYSTOLIC BLOOD PRESSURE: 150 MMHG | DIASTOLIC BLOOD PRESSURE: 80 MMHG

## 2018-08-18 NOTE — CONS- NEUROLOGY
General Information and HPI
 
Consulting Request
Date of Consult: 08/18/18
Requested By:
Chloé Gray MD
 
Reason for Consult:
Possible TIA or CVA
Source of Information: family
Exam Limitations: dementia
History of Present Illness:
62-year-old -American woman with known vascular dementia with a recently 
stable level of function, awake, ambulatory with walker, frequently calling 
family but requiring assistance for most all ADLs alerted the family when they 
did not hear her calling for a while.  They found her unable to rise with 
apparent left body weakness.  She was "glassy eyed", not as responsive as usual 
but with eyes open.  The patient did not voice any complaints.
At this time she appears back to baseline except that she has been unable to 
stand today.  The patient herself can give no useful information
 
Allergies/Medications
Allergies:
Coded Allergies:
NO KNOWN ALLERGIES (12/11/16)
 
Home Med List:
Atorvastatin Calcium 40 MG TABLET   1 TAB PO DAILY CHOLESTEROL  (Reported)
Dipyridamole W/ Aspirin (Aggrenox 25 MG-200 MG Capsule) 25 MG-200 MG CPMP.12HR  
1 CAP PO BID HEART HEALTH  (Reported)
Lactose-Reduced Food (Ensure Liquid) 237 ML LIQUID   237 ML PO BID NUTRITIONAL 
SUPPLEMENT  (Reported)
Lisinopril 30 MG TABLET   1 TAB PO DAILY BP  (Reported)
Sertraline HCl 50 MG TABLET   1 TAB PO DAILY MENTAL HEALTH  (Reported)
 
Current Medications:
 Current Medications
 
 
  Sig/Vivi Start time  Last
 
Medication Dose Route Stop Time Status Admin
 
Atorvastatin Calcium 40 MG DAILY 08/18 0900 AC 08/18
 
  PO   0901
 
Dipyridamole/Aspirin 1 CAP BID 08/17 2100 AC 08/18
 
  PO   0901
 
Lisinopril 30 MG DAILY 08/17 2035 AC 08/18
 
  PO   0901
 
Potassium Chloride 40 MEQ ONCE ONE 08/18 1030 DC 
 
  PO 08/18 1031  
 
Potassium Chloride 0 .STK-MED ONE 08/17 1849 DC 
 
  PO   
 
Potassium Chloride 20 MEQ ONCE ONE 08/17 1830 DC 08/17
 
  PO 08/17 1831  1904
 
 
 
 
Review of Systems
Review of Systems:
Unobtainable due to dementia
 
Past History
 
Travel History
Traveled to [place] past 21 day No
 
Medical History
Neurological: CVA 2001, 2011 major ones multiple CVAs in between 2001 and 2011
EENT: NONE
Cardiovascular: hypertension, hyperlipidemia
Respiratory: NONE
Gastrointestinal: NONE
Hepatic: hepatitis C
Renal: NONE
Musculoskeletal: NONE
Psychiatric: NONE
Endocrine: NONE
Blood Disorders: NONE
Cancer(s): NONE
GYN/Reproductive: NONE
 
Surgical History
Surgical History: non-contributory, 1
 
Psychosocial History
Who Do You Live With? child, family
Primary Language: English
Smoking Status: Unknown If Ever Smoked
Living Will? no
 
Functional Ability
ADLs
Needs Assist: dressing, eating, toileting, bathing. 
Ambulation: cane, walker
IADLs
Needs Assist: shopping, housework, finances, food prep, telephone, 
transportation, medication admin. 
 
Exam & Diagnostic Data
Vital Signs and I&O
Vital Signs
 
 
Date Time Temp Pulse Resp B/P B/P Pulse O2 O2 Flow FiO2
 
     Mean Ox Delivery Rate 
 
08/18 0901  65  150/80     
 
08/18 0800  80  150/80     
 
08/18 0654  65  150/80     
 
08/18 0630 98.3 65 18 150/80  96   
 
08/18 0447  66  158/98     
 
08/17 2242 98.2 62 18 158/90  98 Room Air  
 
08/17 2053  72  190/90     
 
08/17 1958 98.6 71 20 190/90  97 Room Air  
 
08/17 1937  72       
 
08/17 1809    204/100     
 
08/17 1649      96 Room Air  
 
08/17 1645 98.9  18 220/108  97 Room Air  
 
 
 Intake & Output
 
 
 08/18 1600 08/18 0800 08/18 0000
 
Intake Total   
 
Output Total   
 
Balance   
 
    
 
Patient   94 lb 10 oz
 
Weight   
 
Weight   Bed scale
 
Measurement   
 
Method   
 
 
 
Physical Exam:
On exam she is a well cared for and comfortable appearing woman eating lunch, 
frequently reaching for the custard but being redirected back to her main meal 
by her daughter.  She follows one-step commands and said hello but otherwise 
would not speak.  Visual fields appear full as she plans us towards movement 
from both the left and right, pupils small but equal and eye movements appear 
full.  There is a very mild right lower facial weakness.  Using both hands 
equally.   strength equal but mildly weak.  Tendon reflexes hypoactive but 
there are bilateral Babinski signs.  Both lower extremities reveal increased 
tone in a spastic manner.  Responded to touch in all 4 extremities.  Gait not 
tested
Last 48 Hours of Lab Results:
 Laboratory Tests
 
 
 08/18 08/17 08/17
 
 0625 1645 1642
 
Chemistry   
 
  Sodium (137 - 145 mmol/L) 139 143 
 
  Potassium (3.5 - 5.1 mmol/L) 3.4  L 3.1  L 
 
  Chloride (98 - 107 mmol/L) 102 105 
 
  Carbon Dioxide (22 - 30 mmol/L) 28 29 
 
  Anion Gap (5 - 16) 9 9 
 
  BUN (7 - 17 mg/dL) 16 17 
 
  Creatinine (0.5 - 1.0 mg/dL) 0.8 0.8 
 
  Estimated GFR (>60 ml/min) > 60 > 60 
 
  BUN/Creatinine Ratio (7 - 25 %) 20.0 21.3 
 
  Glucose (65 - 99 mg/dL)  118  H 
 
  Calcium (8.4 - 10.2 mg/dL)  9.6 
 
  Total Bilirubin (0.2 - 1.3 mg/dL)  0.4 
 
  AST (14 - 36 U/L)  18 
 
  ALT (9 - 52 U/L)  22 
 
  Alkaline Phosphatase (<127 U/L)  53 
 
  Troponin I (< 0.11 ng/ml) < 0.01 < 0.01 
 
  Total Protein (6.3 - 8.2 g/dL)  7.7 
 
  Albumin (3.5 - 5.0 g/dL)  4.2 
 
  Globulin (1.9 - 4.2 gm/dL)  3.5 
 
  Albumin/Globulin Ratio (1.1 - 2.2 %)  1.2 
 
Hematology   
 
  CBC w Diff  NO MAN DIFF REQ 
 
  WBC (4.8 - 10.8 /CUMM)  6.0 
 
  RBC (4.20 - 5.40 /CUMM)  3.94  L 
 
  Hgb (12.0 - 16.0 G/DL)  12.1 
 
  Hct (37 - 47 %)  35.5  L 
 
  MCV (81.0 - 99.0 FL)  90.0 
 
  MCH (27.0 - 31.0 PG)  30.6 
 
  MCHC (33.0 - 37.0 G/DL)  34.1 
 
  RDW (11.5 - 14.5 %)  15.3  H 
 
  Plt Count (130 - 400 /CUMM)  261 
 
  MPV (7.4 - 10.4 FL)  7.7 
 
  Gran % (42.2 - 75.2 %)  69.3 
 
  Lymphocytes % (20.5 - 51.1 %)  24.5 
 
  Monocytes % (1.7 - 9.3 %)  5.3 
 
  Eosinophils % (0 - 5 %)  0.6 
 
  Basophils % (0.0 - 2.0 %)  0.3 
 
  Absolute Granulocytes (1.4 - 6.5 /CUMM)  4.2 
 
  Absolute Lymphocytes (1.2 - 3.4 /CUMM)  1.5 
 
  Absolute Monocytes (0.10 - 0.60 /CUMM)  0.3 
 
  Absolute Eosinophils (0.0 - 0.7 /CUMM)  0 
 
  Absolute Basophils (0.0 - 0.2 /CUMM)  0 
 
Urines   
 
  Urine Color   Cancelled
 
  Urine Clarity   Cancelled
 
  Urine pH   Cancelled
 
  Ur Specific Gravity   Cancelled
 
  Urine Protein   Cancelled
 
  Urine Ketones   Cancelled
 
  Urine Nitrite   Cancelled
 
  Urine Bilirubin   Cancelled
 
  Urine Urobilinogen   Cancelled
 
  Ur Leukocyte Esterase   Cancelled
 
  Ur Microscopic   Cancelled
 
  Urine Hemoglobin   Cancelled
 
  Urine Glucose   Cancelled
 
 
 
Imaging/Other Studies:
MRI brain: There is a small acute lacunar infarct extending from the right 
corona
radiata towards the right operculum. No associated mass effect and no
hemorrhagic transformation. No additional acute infarcts accounting for
artifact. Multiple foci of susceptibility artifact within the deep gray
nuclei in the periphery of the left greater than right cerebral hemisphere
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
There are extensive T2 signal changes throughout the supratentorial white
matter including the anterior temporal pole white matter external coronal
capsules as well as the corpus callosum.
 
Assessment/Plan
Assessment:
Acute small lacunar stroke, subcortical on the right consistent with the report 
of left-sided weakness yesterday.
There has been a degree of clinical improvement as she is using both arms 
equally, but was unable to stand.
The event occurred on Aggrenox and a statin
Initial blood pressure very high but now 150/80
Recommendations:
Continue Aggrenox and the statin
Considered change to Plavix but based on available studies Aggrenox offers 
somewhat better protection.
Carotid Doppler would be interesting to determine degree of atherosclerotic 
change but could be deferred.  I do not feel that she would be a candidate for 
endarterectomy should stenosis be found.
She is not a candidate for full anticoagulation with the microhemorrhages seen 
on MRI and therefore I feel that echocardiogram could also be reasonably 
deferred.
Physical therapy consultation.
May need short-term rehab.  
 
 
Consult Acknowledgment
- Thank you for your consult request.

## 2018-08-18 NOTE — MRI REPORT
MR BRAIN WITHOUT CONTRAST
 
CLINICAL INFORMATION:
TIA. Altered mental status. Right lower extremity weakness.
 
COMPARISON:
Head CT 08/17/2018.  Brain MRI 12/12/2016.
 
TECHNIQUE:
MRI of the brain without contrast was obtained using routine sequences.
 
FINDINGS:
There is a small acute lacunar infarct extending from the right corona
radiata towards the right operculum. No associated mass effect and no
hemorrhagic transformation. No additional acute infarcts accounting for
artifact. Multiple foci of susceptibility artifact within the deep gray
nuclei in the periphery of the left greater than right cerebral hemisphere
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
There are extensive T2 signal changes throughout the supratentorial white
matter including the anterior temporal pole white matter external coronal
capsules as well as the corpus callosum. Differential considerations include
advanced chronic microangiopathy, CADASIL, and demyelinating disease. There
are chronic lacunar infarcts within the deep gray nuclei bilaterally, the
brainstem, and the cerebellar hemispheres. Progressive global cerebral volume
loss. There is a focus of susceptibility artifact within the anteromedial
aspect of the inferior right thalamus that in retrospect exhibits mildly
increased density on yesterday's head CT, possibly a focus of petechial
hemorrhage that is stable on MRI and that can be followed with noncontrast
head CT.
There is no hydrocephalus, extra-axial surface collection, or herniation. The
major flow voids at the skull base are preserved. The cerebellar tonsils are
normally positioned. The craniocervical junction is normal. Osseous marrow
signal intensity is homogenous. The visualized soft tissues are unremarkable.
 
 
IMPRESSION:
- There is a small acute lacunar infarct extending from the right corona
radiata towards the right operculum.
 
- There is a small punctate focus of susceptibility artifact within the
anteromedial aspect of the inferior right thalamus that in retrospect
exhibits mildly increased density on yesterday's head CT, possibly a focus of
petechial hemorrhage that is stable on MRI and that can be followed with
noncontrast head CT.
 
- There are progressive extensive T2 signal changes throughout the
supratentorial white matter including the anterior temporal pole white matter
and the external capsules as well as the corpus callosum. Differential
considerations include advanced chronic microangiopathy, CADASIL, and
demyelinating disease.
 
- Multiple progressive foci of susceptibility artifact within the deep gray
nuclei in the periphery of the left greater than right cerebral hemisphere
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
Amyloid considered less likely given the patient's age.
 
- There are chronic lacunar infarcts within the deep gray nuclei bilaterally,
the brainstem, and the cerebellar hemispheres.
 
- Progressive global cerebral volume loss.
 
Covering provider has been paged with these findings at 1:45 PM on 08/18/2018.

## 2018-08-18 NOTE — PN- HOUSESTAFF
Michael Robles 18 0843:
Subjective
Follow-up For:
Right lower extremity weakness
Subjective:
No acute events overnight, afebrile.  Patient is daughter sitting at bedside, 
patient has no complaint of, is aware of her being in the hospital.  Patient's 
daughter states mother is at baseline however still concerned of right lower 
extremity weakness.  Denies fever, night sweats, chills
 
Review of Systems
Constitutional:
Reports: see HPI. 
 
Objective
Last 24 Hrs of Vital Signs/I&O
 Vital Signs
 
 
Date Time Temp Pulse Resp B/P B/P Pulse O2 O2 Flow FiO2
 
     Mean Ox Delivery Rate 
 
 0901  65  150/80     
 
 0800  80  150/80     
 
 0654  65  150/80     
 
 0630 98.3 65 18 150/80  96   
 
 0447  66  158/98     
 
 2242 98.2 62 18 158/90  98 Room Air  
 
 2053  72  190/90     
 
 1958 98.6 71 20 190/90  97 Room Air  
 
 1937  72       
 
 1809    204/100     
 
 1649      96 Room Air  
 
 1645 98.9  18 220/108  97 Room Air  
 
 
 Intake & Output
 
 
  1600  0800  0000
 
Intake Total   
 
Output Total   
 
Balance   
 
    
 
Patient   94 lb 10 oz
 
Weight   
 
Weight   Bed scale
 
Measurement   
 
Method   
 
 
 
 
Physical Exam
General Appearance: Alert, Oriented X3, Cooperative
Cardiovascular: Regular Rate, Normal S1, Normal S2
Lungs: Clear to Auscultation, Normal Air Movement
Neurological: BILAT LE STRENGTH 3/5
Extremities: SIGNIFICANT ATROPHY OF LE MUSCLES 
 
Assessment/Plan
Assessment:
Mrs Rodgers is a 62F with history of vascular dementia, s/p multiple previous CVA
's (last hospitalization at [facility] in 2016 for CVA) with residual RLE weakness 
and slurring of speech, on Aggrenox at home, Hep C, HLD, presents with worsening
RLE weakness since 10am this morning, unable to ambulate per daughter and 
hypertensive. As she has baseline dementia, this is hypertensive urgency until 
proven otherwise. She had focal neuro deficits (decreased RLE strength with 
inability to ambulate) however was 4+ on exam in ED, likely resolved. Her last 
normal was last night, and so is out of the window for tPA. CT head negative; 
MRI ordered but not completed.
 
Problem List:
1. Cerebrovascular accident 
2. HypoKalemia
3. Hypertension
 
 
 
#CVA while on aggrenox: Patient sustained an acute small lacunar stroke 
extending from the right corona radiate towards the right operculum. Focus of 
petechial hemorrhage. Patient has history of multiple CVAs. 
-will continue Aggrenox 
-continue Atorvastatin 40mg 
-consult Physical Therapy 
-will defer carotid doppler and echo for right now 
-plan to repeat head CT for evaulation of petechial hemorrhage in 48 hours, and 
if it resolves will reasses for full anti-coagulation
 
 
#Hypokalemia: Potassium on admission is 3.1, today at 3.4
-replete Potassium
 
 
#Hypertension: BP on present was 220/108, today has been 150/80
-Lisinopril 30mg 
 
 
DVT PPx: ALPS and Heparin
Regular Diet
Full Code
 
Problem List:
 1. CVA (cerebral vascular accident)
 
Pain Ratin
Pain Location:
n/a
Pain Goal: Remain pain free
Pain Plan:
tylenol
Tomorrow's Labs & Rationales:
Sona Avery 18 1426:
Attending MD Review Statement
 
Attending Statement
Attending MD Statement: examined this patient, discuss w/resident/PA/NP, agreed 
w/resident/PA/NP, discussed with family, reviewed EMR data (avail)
Attending Assessment/Plan:
Stroke- MRI brain does show new small acute stroke. d/w neurology- cont with 
current antiplatelet therapy with aggrenox. Bp control is better since 
admission. 
Will f/u on  final neuro recommendations. 
 
dw/ pt and pts family at bedside the care plan.

## 2018-08-19 VITALS — SYSTOLIC BLOOD PRESSURE: 130 MMHG | DIASTOLIC BLOOD PRESSURE: 66 MMHG

## 2018-08-19 VITALS — DIASTOLIC BLOOD PRESSURE: 88 MMHG | SYSTOLIC BLOOD PRESSURE: 150 MMHG

## 2018-08-19 VITALS — SYSTOLIC BLOOD PRESSURE: 160 MMHG | DIASTOLIC BLOOD PRESSURE: 84 MMHG

## 2018-08-19 VITALS — DIASTOLIC BLOOD PRESSURE: 90 MMHG | SYSTOLIC BLOOD PRESSURE: 164 MMHG

## 2018-08-19 NOTE — RADIOLOGY REPORT
2 VIEWS OF THE RIGHT HIP
 
CLINICAL INFORMATION:
Painful right hip.
 
COMPARISON:
Pelvic radiographs 04/17/2018.
 
TECHNIQUE:
Two views of the right hip.
 
FINDINGS:
The frontal and lateral views of the right hip are very limited secondary to
patient positioning. Femoral acetabular alignment is maintained. No definite
fractures are identified with assessment limited by patient obliquity. The
imaged bony pelvis is intact. There are degenerative changes within the lower
lumbar spine. There is arterial atherosclerotic calcification.
 
IMPRESSION:
This examination is significantly limited by patient positioning with no
definite acute osseous findings identified. There are degenerative changes
within the lower lumbar spine. Arterial atherosclerotic calcification.

## 2018-08-19 NOTE — PN- HOUSESTAFF
Aamir Winter 18 0839:
Subjective
Follow-up For:
Right lower extremity weakness
Complaints: no complaints
Tele-Events Since Last Visit:
Sinus rhythm 75-94 bpm with some junctional beats
Subjective:
Review the patient lying on the bed able to respond obeying commands but 
speaking with difficulty which is her baseline.  Patient denies anything 
bothering her overnight
 
Review of Systems
Constitutional:
Denies: chills, fever. 
Cardiovascular:
Denies: chest pain, palpitations. 
Comments:
Patient unable to respond to review of system questions
 
Objective
Last 24 Hrs of Vital Signs/I&O
 Vital Signs
 
 
Date Time Temp Pulse Resp B/P B/P Pulse O2 O2 Flow FiO2
 
     Mean Ox Delivery Rate 
 
 0932  73  164/90     
 
 0600 97.8 73 22 164/90  97   
 
 2213 98.1 91 20 140/72  97 Room Air  
 
 1451 98.6 82 18 118/70  97 Room Air  
 
 
 Intake & Output
 
 
  1600  0800  0000
 
Intake Total  120 240
 
Output Total   
 
Balance  120 240
 
    
 
Intake, Oral  120 240
 
Patient   101 lb
 
Weight   
 
Weight   Bed scale
 
Measurement   
 
Method   
 
 
 
 
Physical Exam
General Appearance: Alert, Cooperative, No Acute Distress
Skin: No Rashes
Skin Temp/Moisture Exam: Warm/Dry
Sepsis Skin Exam (color): Normal for Ethnicity
HEENT: Atraumatic, Mucous Membr. moist/pink
Neck: Supple, No JVD
Cardiovascular: Regular Rate, Normal S1, Normal S2
Lungs: Clear to Auscultation, Normal Air Movement
Abdomen: Normal Bowel Sounds, Soft, No Tenderness
Neurological: Decreased strength lower extermities unchanged from admission
Extremities: No Clubbing, No Cyanosis
Current Medications:
 Current Medications
 
 
  Sig/Vivi Start time  Last
 
Medication Dose Route Stop Time Status Admin
 
Atorvastatin Calcium 40 MG DAILY  09 AC 
 
  PO   0932
 
Dipyridamole/Aspirin 1 CAP BID 2100 AC 
 
  PO   0933
 
Lisinopril 30 MG DAILY 2035 AC 
 
  PO   0932
 
Potassium Chloride 0 .STK-MED ONE 2028 DC 
 
  PO   
 
Potassium Chloride 40 MEQ ONCE ONE  1030 DC 
 
  PO  [id number]
 
 
 
 
Last 24 Hrs of Lab/[facility]oses Results
Last 24 Hrs of Labs/Mics:
 Laboratory Tests
 
18 0655:
Anion Gap 6, Estimated GFR > 60, BUN/Creatinine Ratio 23.8
 
 
Assessment/Plan
Assessment:
This is a 62F with history of vascular dementia, s/p multiple previous CVA's (
last hospitalization at [place] in 2016 for CVA) with residual RLE weakness and 
slurring of speech, on Aggrenox at home, Hep C, HLD, presents with worsening RLE
weakness since 10am this morning, unable to ambulate per daughter and 
hypertensive. As she has baseline dementia, this is hypertensive urgency until 
proven otherwise. She had focal neuro deficits (decreased RLE strength with 
inability to ambulate) however was 4+ on exam in ED, likely resolved. Her last 
normal was last night, and so is out of the window for tPA. CT head negative; 
MRI ordered but not completed.
 
CVA while on aggrenox: Patient sustained an acute small lacunar stroke extending
from the right corona radiate towards the right operculum. Focus of petechial 
hemorrhage. Patient has history of multiple CVAs.  continue Aggrenox and also 
continue Atorvastatin 40m
 
Hypokalemia (resolved)
On presentation had potassium of 3.1 today potassium 4.0
 
Uncontrolled hypertension
Admission blood pressure as high as 220/108, BP has responded and decreased 
steadily overnight highest 164/90 and continue with blood pressure medications
Problem List:
 1. CVA (cerebral vascular accident)
 
 2. HTN (hypertension)
 
 3. HLD (hyperlipidemia)
 
Pain Ratin
Pain Location:
None
Pain Goal: Remain pain free
Pain Plan:
PRN pain meds
Tomorrow's Labs & Rationales:
BEP
DVT/Prophylaxis: pharmacological
 
 
Sona Burton 18 0955:
Attending MD Review Statement
 
Attending Statement
Attending MD Statement: examined this patient, discuss w/resident/PA/NP, agreed 
w/resident/PA/NP, reviewed EMR data (avail)
Attending Assessment/Plan:
Stroke- MRI brain does show new small acute lacunar infarct in rt corona radiata
region. d/w neurology- cont with current antiplatelet therapy with aggrenox. Bp 
control is better since admission
 
Hypokalemia- resolved now. 
 
PT to reevaluate on monday. Pt stays with daughter . Case management to look for
STR if PT recommends that.

## 2018-08-20 VITALS — SYSTOLIC BLOOD PRESSURE: 138 MMHG | DIASTOLIC BLOOD PRESSURE: 94 MMHG

## 2018-08-20 VITALS — DIASTOLIC BLOOD PRESSURE: 86 MMHG | SYSTOLIC BLOOD PRESSURE: 150 MMHG

## 2018-08-20 VITALS — DIASTOLIC BLOOD PRESSURE: 84 MMHG | SYSTOLIC BLOOD PRESSURE: 126 MMHG

## 2018-08-20 LAB
ABSOLUTE GRANULOCYTE CT: 4.8 /CUMM (ref 1.4–6.5)
BASOPHILS # BLD: 0 /CUMM (ref 0–0.2)
BASOPHILS NFR BLD: 0.4 % (ref 0–2)
EOSINOPHIL # BLD: 0.1 /CUMM (ref 0–0.7)
EOSINOPHIL NFR BLD: 1.5 % (ref 0–5)
ERYTHROCYTE [DISTWIDTH] IN BLOOD BY AUTOMATED COUNT: 15.5 % (ref 11.5–14.5)
GRANULOCYTES NFR BLD: 63.6 % (ref 42.2–75.2)
HCT VFR BLD CALC: 35.8 % (ref 37–47)
LYMPHOCYTES # BLD: 2.1 /CUMM (ref 1.2–3.4)
MCH RBC QN AUTO: 29.7 PG (ref 27–31)
MCHC RBC AUTO-ENTMCNC: 32.9 G/DL (ref 33–37)
MCV RBC AUTO: 90.4 FL (ref 81–99)
MONOCYTES # BLD: 0.5 /CUMM (ref 0.1–0.6)
PLATELET # BLD: 253 /CUMM (ref 130–400)
PMV BLD AUTO: 8.2 FL (ref 7.4–10.4)
RED BLOOD CELL CT: 3.96 /CUMM (ref 4.2–5.4)
WBC # BLD AUTO: 7.6 /CUMM (ref 4.8–10.8)

## 2018-08-20 NOTE — PATIENT DISCHARGE INSTRUCTIONS
Discharge Instructions
 
General Discharge Information
You were seen/treated for:
Stroke
You had these procedures:
No procedures were performed during current visit
Watch for these problems:
Fever, chest pain, shortness of breath, palpitations
Special Instructions:
Please follow-up with your PCP, take medications as directed.  Please follow-up 
with a neurologist
 
Diet
Continue normal diet: Yes (r[place])
Recommended Diet: Regular
 
Acute Coronary Syndrome
 
Inclusion Criteria
At DC or during hospital stay patient has or had the following:
ACS DIAGNOSIS No
 
Discharge Core Measures
Meds if any: Prescribed or Continued at Discharge
Meds if any: NOT Prescribed or Continued at Discharge
 
Congestive Heart Failure
 
Inclusion Criteria
At DC or during hospital stay patient has or had the following:
CHF DIAGNOSIS No
 
Discharge Core Measures
Meds if any: Prescribed or Continued at Discharge
Meds if any: NOT Prescribed or Continued at Discharge
 
Cerebrovascular accident
 
Inclusion Criteria
At DC or during hospital stay patient has or had the following:
 
Discharge Core Measures
Meds if any: Prescribed or Continued at Discharge
Antithrombotic No
Statin (required if LDL =>70) Yes
Anticoagulant Yes
Meds if any: NOT Prescribed or Continued at Discharge
 
Venous thromboembolism
 
Inclusion Criteria
VTE Diagnosis No
VTE Type NONE
VTE Confirmed by (Test) NONE
 
Discharge Core Measures
- Per Current guidelines, there needs to be overlap
- treatment for the first 5 days of Warfarin therapy.
- If discharged on Warfarin prior to 5 days of
- overlap therapy, the patient will need to be
- assessed for post discharge needs including
- *Post discharge parental anticoagulation
- *Warfarin and/or parental anticoagulation education
- *Follow up date to check INR post discharge
At least 5 days overlap therapy as Inpatient No
Meds if any: Prescribed or Continued at Discharge
Note: Overlap Therapy is Warfarin and Anticoagulant
Meds if any: NOT Prescribed or Continued at Discharge

## 2018-08-20 NOTE — PN- HOUSESTAFF
TravisYabita 18 0847:
Subjective
Follow-up For:
Worsened RLE weakness 
Subjective:
Patient was in NSR overnight 57-89. Has no c/o today, patient did express 
concern of her health when told of most recent infarct, although patient is 
difficult to understand with resultant dysarthria.  Denies fever, night sweats, 
chills
 
Review of Systems
Constitutional:
Reports: see HPI. 
 
Objective
Last 24 Hrs of Vital Signs/I&O
 Vital Signs
 
 
Date Time Temp Pulse Resp B/P B/P Pulse O2 O2 Flow FiO2
 
     Mean Ox Delivery Rate 
 
 1440 97.8 90 20 150/86  98   
 
 1144       Room Air  
 
 0820  81  140/84     
 
 0739 98.7 74 18 138/94  98 Room Air  
 
 0000       Room Air  
 
 2313    150/88     
 
 2221 98.7 77 18 160/84  98   
 
 
 Intake & Output
 
 
  1600  0800  0000
 
Intake Total 320 120 120
 
Output Total   
 
Balance 320 120 120
 
    
 
Intake, Oral 320 120 120
 
Number   2
 
Bowel   
 
Movements   
 
Patient 104 lb  104 lb
 
Weight   
 
 
 
 
Physical Exam
General Appearance: Alert, Oriented X3, Cooperative
Cardiovascular: Regular Rate, Normal S1, Normal S2
Lungs: Clear to Auscultation, Normal Air Movement
Abdomen: Normal Bowel Sounds, Soft, No Tenderness
Extremities: severe atrophy of LE 
 
Assessment/Plan
Assessment:
This is a 62F with history of vascular dementia, s/p multiple previous CVA's (
last hospitalization at [place] in 2016 for CVA) with residual RLE weakness and 
slurring of speech, on Aggrenox at home, Hep C, HLD, presents with worsening RLE
weakness since 10am on 18, unable to ambulate per daughter and 
hypertensive. She had focal neuro deficits (decreased RLE strength with 
inability to ambulate) however was 4+ on exam in ED, likely resolved. Her last 
normal was last night, and so is out of the window for tPA. 
 
CVA while on aggrenox: Patient sustained an acute small lacunar stroke extending
from the right corona radiate towards the right operculum. Focus of petechial 
hemorrhage. Patient has history of multiple CVAs.  
-continue Aggrenox 
-continue Atorvastatin 40m
 
Hypokalemia (resolved)
On presentation had potassium of 3.1 today potassium 4.0
 
Uncontrolled hypertension
Admission blood pressure as high as 220/108, BP has come down but SBP remains 
between 130-150s 
-Increase lisinopril to 40 mg
 
Code Status: Full
Diet: Regular 
DVT PPx: Aggrenox
Problem List:
 1. TIA (transient ischemic attack)
 
Pain Ratin
Pain Location:
n/a
Pain Goal: Remain pain free
Pain Plan:
tylenol
Tomorrow's Labs & Rationales:
none
 
 
Daniel Echols MDsureshzoila 18 1339:
Attending MD Review Statement
 
Attending Statement
Attending MD Statement: examined this patient, discuss w/resident/PA/NP, agreed 
w/resident/PA/NP, reviewed EMR data (avail), discussed with nursing, discussed 
with case mgmt, amended to note
Attending Assessment/Plan:
Patient seen and examined.  62-year-old female with history of known vascular 
dementia.  She frequently requires assistance for most activities of daily 
living.  Was found by family unable to get up with left-sided weakness.  She was
also not as responsive at baseline.  MRI reveals small acute lacunar infarct 
extending from the right corona radiata towards the right operculum.  Small 
punctate infarct involving the right thalamus possibly focus of petechial 
hemorrhage multiple progressive foci of susceptibility infarct within the deep 
gray nuclei bilateral cerebral hemispheres.  Most likely reflecting chronic 
hypertensive petechial microhemorrhages.  Chronic lacunar infarcts involving the
brainstem and cerebellar hemispheres.
 
 
Recommendations:
Discharge planning to skilled nursing facility for short-term rehabilitation.
Continue antiplatelet therapy with  Aggrenox.  No additional benefit in 
switching patient to Plavix.
Carotid Doppler has been deferred to the outpatient setting.  She is not a 
candidate for invasive procedures given her dementia.
No erythema has been detected here on telemetry monitoring.  An echocardiogram 
will evaluate for any intracardiac thrombus however she will not be due to 
receive any full dose anticoagulation due to her microhemorrhages.  In view of 
this the echocardiogram may be done in the outpatient setting.
Patient may be discharged once a bed becomes available. 
Recommend the family should engage in goals of care discussion with patient's 
primary care provider as an outpatient.

## 2018-08-20 NOTE — DISCHARGE SUMMARY
Visit Information
 
Visit Dates
Admission Date:
08/17/18
 
Discharge Date:
08/21/18
 
Hospital Course
 
Course
Attending Physician:
Gil Echols MD
 
Primary Care Physician:
Natalie Godwin
 
[facility] Course:
This is a 62F with history of vascular dementia, s/p multiple previous CVA's (
last hospitalization at [place] in 2016 for CVA) with residual RLE weakness and 
slurring of speech, on Aggrenox at home, Hep C, HLD, presents with worsening RLE
weakness since 10am this morning, unable to ambulate per daughter and 
hypertensive. As she has baseline dementia, this is hypertensive urgency until 
proven otherwise. She had focal neuro deficits (decreased RLE strength with 
inability to ambulate) however was 4+ on exam in ED, likely resolved. Her last 
normal was last night, and so is out of the window for tPA. CT head negative; 
MRI ordered but not completed.
 
Patient was admitted to the telemetry floor and following issues were addressed;
1. CVA while on aggrenox: 
Patient sustained an acute small lacunar stroke extending from the right corona 
radiate towards the right operculum. Patient has history of multiple CVAs.  
Neurology consult was called who recommended continuing Aggrenox and 
atorvastatin. Patient was not a candidate for full anticoagulation with the 
microhemorrhages seen on MRI.  Patient was evaluated by PT and recommended STR.
 
2. Hypokalemia (resolved)
On presentation had potassium of 3.1. Potassium was monitored and repleted 
accordingly. K level was 4.0 at the time of discharge.
 
3. Uncontrolled hypertension
On Admission patients blood pressure was as high as 220/108, her home 
medications were continued with adequate response.  BP was stable at the time of
discharge
Allergies:
Coded Allergies:
NO KNOWN ALLERGIES (12/11/16)
 
Significant Procedures:
CT HEAD WO IV CONTRAST
IMPRESSION:
No evidence for acute intracranial injury. Stable appearance of the brain.
 
XRY-PORTABLE CHEST XRAY
IMPRESSION:
No evidence for acute disease.
 
MRI-HEAD W/O HEMANTH
IMPRESSION:
- There is a small acute lacunar infarct extending from the right corona
radiata towards the right operculum.
 
- There is a small punctate focus of susceptibility artifact within the
anteromedial aspect of the inferior right thalamus that in retrospect
exhibits mildly increased density on yesterday's head CT, possibly a focus of
petechial hemorrhage that is stable on MRI and that can be followed with
noncontrast head CT.
 
- There are progressive extensive T2 signal changes throughout the
supratentorial white matter including the anterior temporal pole white matter
and the external capsules as well as the corpus callosum. Differential
considerations include advanced chronic microangiopathy, CADASIL, and
demyelinating disease.
 
- Multiple progressive foci of susceptibility artifact within the deep gray
nuclei in the periphery of the left greater than right cerebral hemisphere
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
Amyloid considered less likely given the patient's age.
 
- There are chronic lacunar infarcts within the deep gray nuclei bilaterally,
the brainstem, and the cerebellar hemispheres.
 
- Progressive global cerebral volume loss.
 
Covering provider has been paged with these findings at 1:45 PM on 08/18/2018.
 
XRY-HIP 2-3 VIEWS, RIGHT
IMPRESSION:
This examination is significantly limited by patient positioning with no
definite acute osseous findings identified. There are degenerative changes
within the lower lumbar spine. Arterial atherosclerotic calcification.
 
 
Disposition Summary
 
Disposition
Principal Diagnosis:
CVA(Acute small lacunar stroke)
Additional Diagnosis:
Petechial Hemorrhage
Hypokalemia
Uncontrolled HTN
Discharge Disposition: SNF
 
Discharge Instructions
 
General Discharge Information
Code Status: Full Code
Patient's Diet:
Regular
Patient's Activity:
As tolerated
Follow-Up Instructions/Appts:
Patient advised to follow-up with her PCP and neurologist within a week after 
discharge.
Monitor blood pressure closely.
Obtain echocardiogram and carotid Doppler, follow-up results of patient's 
primary care provider.
 
Medications at Discharge
Discharge Medications:
Continue taking these medications:
Dipyridamole W/ Aspirin (Aggrenox 25 MG-200 MG Capsule) 25 MG-200 MG CPMP.12HR
    1 Capsule ORAL TWICE DAILY
    Comments:
       Last Taken: 8/21/18
             Time: 10:00 AM
 
Sertraline HCl (Sertraline HCl) 50 MG TABLET
    1 Tablet ORAL DAILY
    Comments:
       NOT GIVEN IN HOSPITAL
 
Lisinopril (Lisinopril) 30 MG TABLET
    1 Tablet ORAL DAILY
    Qty = 90
    Comments:
       Last Taken: 8/21/18
             Time: 10:00 AM
 
Atorvastatin Calcium (Atorvastatin Calcium) 40 MG TABLET
    1 Tablet ORAL DAILY
    Qty = 90
    Comments:
       Last Taken: 8/21/18
             Time: 10:00 AM
 
Lactose-Reduced Food (Ensure Liquid) 237 ML LIQUID
    237 Milliliters ORAL TWICE DAILY
    Qty = 26121
    Comments:
       NOT GIVEN IN HOSPITAL
 
 
Copies To:
Jimmy SHARMA,Aamir THURSTON; Natalie Godwin
 
Attending MD Review Statement
Documenting Attending:
Gil Echols MD
Other Findings:
Medically stable to discharge today.

## 2018-08-21 VITALS — SYSTOLIC BLOOD PRESSURE: 138 MMHG | DIASTOLIC BLOOD PRESSURE: 72 MMHG

## 2018-08-21 VITALS — DIASTOLIC BLOOD PRESSURE: 72 MMHG | SYSTOLIC BLOOD PRESSURE: 138 MMHG

## 2018-08-21 NOTE — PN- HOUSESTAFF
TravisYabita 18 0714:
Subjective
Follow-up For:
Stroke
Subjective:
Patient is in normal sinus rhythm overnight, heart rate ranging between 60-92.  
Patient has no complaint of this morning.
 
Review of Systems
Constitutional:
Denies: chills, diaphoresis, fever. 
 
Objective
Last 24 Hrs of Vital Signs/I&O
 Vital Signs
 
 
Date Time Temp Pulse Resp B/P B/P Pulse O2 O2 Flow FiO2
 
     Mean Ox Delivery Rate 
 
 1009  86  138/72     
 
 0803 98.4 86 20 138/72  98   
 
 2353       Room Air  
 
 2217 98.6 79 22 126/84  97   
 
 1440 97.8 90 20 150/86  98   
 
 1144       Room Air  
 
 
 Intake & Output
 
 
  1600  0800  0000
 
Intake Total   50
 
Output Total   
 
Balance   50
 
    
 
Intake, Oral   50
 
Number   1
 
Bowel   
 
Movements   
 
Patient   102 lb
 
Weight   
 
 
 
 
Physical Exam
General Appearance: Alert, Oriented X3, Cooperative
Cardiovascular: Regular Rate, Normal S1, Normal S2
Lungs: Clear to Auscultation, Normal Air Movement
Abdomen: Normal Bowel Sounds, Soft, No Tenderness
Neurological: Sensation Intact, Cranial Nerves 3-12 NL
 
Assessment/Plan
Assessment:
This is a 62F with history of vascular dementia, s/p multiple previous CVA's (
last hospitalization at [place] in 2016 for CVA) with residual RLE weakness and 
slurring of speech, on Aggrenox at home, Hep C, HLD, presents with worsening RLE
weakness since 10am on 18, unable to ambulate per daughter and 
hypertensive. She had focal neuro deficits (decreased RLE strength with 
inability to ambulate) however was 4+ on exam in ED, likely resolved. Her last 
normal was last night, and so is out of the window for tPA. 
 
CVA while on aggrenox: Patient sustained an acute small lacunar stroke extending
from the right corona radiate towards the right operculum. Focus of petechial 
hemorrhage. Patient has history of multiple CVAs.  
-continue Aggrenox 
-continue Atorvastatin 40m
 
Hypokalemia (resolved)
On presentation had potassium of 3.1 today potassium 4.0
 
Uncontrolled hypertension
Admission blood pressure as high as 220/108, BP has come down but SBP remains 
between 130-150s 
-continue lisinopril to 40 mg
 
Code Status: Full
Diet: Regular 
DVT PPx: Aggrenox
 
 
Patient will be discharged to [place] for short-term rehabilitation patient will 
continue on Aggrenox, atorvastatin 40 mg, lisinopril 40 mg.  Carotid Doppler and
echocardiogram were deferred inpatient, patient will follow PCP and neurologist 
for further evaluation
Problem List:
 1. CVA (cerebral vascular accident)
 
Pain Ratin
Pain Location:
n/a
Pain Goal: Remain pain free
Pain Plan:
tylenol
Tomorrow's Labs & Rationales:
none
 
 
Gil Echols MD 18 1045:
Attending MD Review Statement
 
Attending Statement
Attending MD Statement: examined this patient, discuss w/resident/PA/NP, agreed 
w/resident/PA/NP, reviewed EMR data (avail), discussed with nursing, discussed 
with case mgmt, amended to note
Attending Assessment/Plan:
Patient seen and examined.  Resting comfortably not in any acute distress.  No 
issues overnight reported by nursing staff.  Is a frail lady is able to 
communicate very low tones.  He reports no discomfort at present.  She is 
medically stable to be discharged and is currently awaiting bed in a skilled 
nursing facility.  He is to follow-up with an echocardiogram and carotid Doppler
as an outpatient.  Given her dementia she is unlikely candidate for any 
aggressive intervention.  Blood pressure was elevated in the 200s on 
presentation.  She was continued on home dose of lisinopril at 30 mg daily.  
Blood pressure improved significantly on that regimen.  She will continue on her
home dose of lisinopril upon discharge.